# Patient Record
Sex: MALE | Race: WHITE | NOT HISPANIC OR LATINO | Employment: OTHER | URBAN - METROPOLITAN AREA
[De-identification: names, ages, dates, MRNs, and addresses within clinical notes are randomized per-mention and may not be internally consistent; named-entity substitution may affect disease eponyms.]

---

## 2018-07-16 ENCOUNTER — HOSPITAL ENCOUNTER (OUTPATIENT)
Dept: CT IMAGING | Facility: HOSPITAL | Age: 63
Discharge: HOME | End: 2018-07-16
Attending: DENTIST

## 2018-07-16 ENCOUNTER — SURGERY - HEALTHEAST (OUTPATIENT)
Dept: SURGERY | Facility: HOSPITAL | Age: 63
End: 2018-07-16

## 2018-07-16 ENCOUNTER — ANESTHESIA - HEALTHEAST (OUTPATIENT)
Dept: SURGERY | Facility: HOSPITAL | Age: 63
End: 2018-07-16

## 2018-07-16 ENCOUNTER — RECORDS - HEALTHEAST (OUTPATIENT)
Dept: ADMINISTRATIVE | Facility: OTHER | Age: 63
End: 2018-07-16

## 2018-07-16 DIAGNOSIS — K12.2 SUBMANDIBULAR ABSCESS: ICD-10-CM

## 2018-07-16 LAB
CREAT BLD-MCNC: 0.9 MG/DL
POC GFR AMER AF HE - HISTORICAL: >60 ML/MIN/1.73M2
POC GFR NON AMER AF HE - HISTORICAL: >60 ML/MIN/1.73M2

## 2018-07-16 ASSESSMENT — MIFFLIN-ST. JEOR: SCORE: 1585.04

## 2018-07-18 ASSESSMENT — MIFFLIN-ST. JEOR
SCORE: 1628.59
SCORE: 1627.25

## 2021-06-01 VITALS — HEIGHT: 70 IN | BODY MASS INDEX: 26.43 KG/M2 | WEIGHT: 184.6 LBS

## 2021-06-16 PROBLEM — M27.2 MANDIBULAR ABSCESS: Status: ACTIVE | Noted: 2018-07-16

## 2021-06-19 NOTE — ANESTHESIA POSTPROCEDURE EVALUATION
Patient: Dva Hernandezler  Incision and Drainage of Submandibular Abscess with Extraction of Tooth 31  Anesthesia type: general    Patient location: PACU  Last vitals:   Vitals:    07/16/18 2050   BP: 165/86   Pulse: (!) 104   Resp: 17   Temp:    SpO2: 95%     Post vital signs: stable  Level of consciousness: sedated  Post-anesthesia pain: pain controlled  Post-anesthesia nausea and vomiting: no  Pulmonary: Intubated/ventilated  Cardiovascular: stable and blood pressure at baseline  Hydration: adequate  Anesthetic events: no    QCDR Measures:  ASA# 11 - Daisy-op Cardiac Arrest: ASA11B - Patient did NOT experience unanticipated cardiac arrest  ASA# 12 - Daisy-op Mortality Rate: ASA12B - Patient did NOT die  ASA# 13 - PACU Re-Intubation Rate: ASA13B - Patient did NOT require a new airway mgmt  ASA# 10 - Composite Anes Safety: ASA10A - No serious adverse event    Additional Notes:Elected to keep patient intubated/sedated overnight to allow for swelling to go down.

## 2021-06-19 NOTE — ANESTHESIA CARE TRANSFER NOTE
Last vitals:   Vitals:    07/16/18 2029   BP: 120/65   Pulse: 95   Resp: 16   Temp: (!) 38.6  C (101.4  F)   SpO2: 95%     Patient's level of consciousness is unconscious  Spontaneous respirations: no   Maintains airway independently: no   Dentition unchanged: yes  Oropharynx: endotracheal tube in  place    QCDR Measures:  ASA# 20 - Surgical Safety Checklist: WHO surgical safety checklist completed prior to induction  PQRS# 430 - Adult PONV Prevention: 4558F - Pt received => 2 anti-emetic agents (different classes) preop & intraop  ASA# 8 - Peds PONV Prevention: NA - Not pediatric patient, not GA or 2 or more risk factors NOT present  PQRS# 424 - Daisy-op Temp Management: 4559F - At least one body temp DOCUMENTED => 35.5C or 95.9F within required timeframe  PQRS# 426 - PACU Transfer Protocol: - Transfer of care checklist used  ASA# 14 - Acute Post-op Pain: ASA14B - Patient did NOT experience pain >= 7 out of 10

## 2021-06-19 NOTE — ANESTHESIA PREPROCEDURE EVALUATION
Anesthesia Evaluation        Airway   Mallampati: III  Neck ROM: full  Comment: Trismus with very limited opening   Pulmonary - negative ROS and normal exam                          Cardiovascular - negative ROS and normal exam   Neuro/Psych - negative ROS     Endo/Other - negative ROS      GI/Hepatic/Renal - negative ROS           Dental - normal exam                        Anesthesia Plan  Planned anesthetic: general endotracheal    ASA 3 - emergent   Induction: intravenous   Anesthetic plan and risks discussed with: patient  Anesthesia plan special considerations: awake and fiberoptic, increased risk of difficult airway,   Post-op plan: routine recovery

## 2022-05-09 ENCOUNTER — HOSPITAL ENCOUNTER (INPATIENT)
Facility: HOSPITAL | Age: 67
LOS: 3 days | Discharge: HOME | DRG: 199 | End: 2022-05-12
Attending: EMERGENCY MEDICINE | Admitting: INTERNAL MEDICINE
Payer: COMMERCIAL

## 2022-05-09 ENCOUNTER — APPOINTMENT (OUTPATIENT)
Dept: CT IMAGING | Facility: HOSPITAL | Age: 67
DRG: 199 | End: 2022-05-09
Attending: EMERGENCY MEDICINE
Payer: COMMERCIAL

## 2022-05-09 DIAGNOSIS — S22.42XA MULTIPLE FRACTURES OF RIBS, LEFT SIDE, INITIAL ENCOUNTER FOR CLOSED FRACTURE: ICD-10-CM

## 2022-05-09 DIAGNOSIS — J94.2 HEMOTHORAX ON LEFT: ICD-10-CM

## 2022-05-09 DIAGNOSIS — J96.01 ACUTE RESPIRATORY FAILURE WITH HYPOXIA (H): Primary | ICD-10-CM

## 2022-05-09 DIAGNOSIS — R09.02 HYPOXIA: ICD-10-CM

## 2022-05-09 LAB
ABO/RH(D): NORMAL
ANION GAP SERPL CALCULATED.3IONS-SCNC: 14 MMOL/L (ref 5–18)
ANTIBODY SCREEN: NEGATIVE
BUN SERPL-MCNC: 10 MG/DL (ref 8–22)
CALCIUM SERPL-MCNC: 9.1 MG/DL (ref 8.5–10.5)
CHLORIDE BLD-SCNC: 96 MMOL/L (ref 98–107)
CO2 SERPL-SCNC: 23 MMOL/L (ref 22–31)
CREAT SERPL-MCNC: 0.78 MG/DL (ref 0.7–1.3)
ERYTHROCYTE [DISTWIDTH] IN BLOOD BY AUTOMATED COUNT: 12.5 % (ref 10–15)
GFR SERPL CREATININE-BSD FRML MDRD: >90 ML/MIN/1.73M2
GLUCOSE BLD-MCNC: 130 MG/DL (ref 70–125)
HCT VFR BLD AUTO: 33.6 % (ref 40–53)
HGB BLD-MCNC: 11.7 G/DL (ref 13.3–17.7)
HOLD SPECIMEN: NORMAL
INR PPP: 1.19 (ref 0.9–1.15)
MAGNESIUM SERPL-MCNC: 2.1 MG/DL (ref 1.8–2.6)
MCH RBC QN AUTO: 35.1 PG (ref 26.5–33)
MCHC RBC AUTO-ENTMCNC: 34.8 G/DL (ref 31.5–36.5)
MCV RBC AUTO: 101 FL (ref 78–100)
PHOSPHATE SERPL-MCNC: 2.8 MG/DL (ref 2.5–4.5)
PLATELET # BLD AUTO: 317 10E3/UL (ref 150–450)
POTASSIUM BLD-SCNC: 3.7 MMOL/L (ref 3.5–5)
RADIOLOGIST FLAGS: ABNORMAL
RBC # BLD AUTO: 3.33 10E6/UL (ref 4.4–5.9)
SARS-COV-2 RNA RESP QL NAA+PROBE: NEGATIVE
SODIUM SERPL-SCNC: 133 MMOL/L (ref 136–145)
SPECIMEN EXPIRATION DATE: NORMAL
WBC # BLD AUTO: 12.2 10E3/UL (ref 4–11)

## 2022-05-09 PROCEDURE — 71260 CT THORAX DX C+: CPT

## 2022-05-09 PROCEDURE — 84100 ASSAY OF PHOSPHORUS: CPT | Performed by: INTERNAL MEDICINE

## 2022-05-09 PROCEDURE — 85027 COMPLETE CBC AUTOMATED: CPT | Performed by: EMERGENCY MEDICINE

## 2022-05-09 PROCEDURE — 36415 COLL VENOUS BLD VENIPUNCTURE: CPT | Performed by: EMERGENCY MEDICINE

## 2022-05-09 PROCEDURE — 87635 SARS-COV-2 COVID-19 AMP PRB: CPT | Performed by: EMERGENCY MEDICINE

## 2022-05-09 PROCEDURE — 250N000011 HC RX IP 250 OP 636: Performed by: EMERGENCY MEDICINE

## 2022-05-09 PROCEDURE — 96361 HYDRATE IV INFUSION ADD-ON: CPT

## 2022-05-09 PROCEDURE — C9803 HOPD COVID-19 SPEC COLLECT: HCPCS

## 2022-05-09 PROCEDURE — 82306 VITAMIN D 25 HYDROXY: CPT | Performed by: INTERNAL MEDICINE

## 2022-05-09 PROCEDURE — 96374 THER/PROPH/DIAG INJ IV PUSH: CPT

## 2022-05-09 PROCEDURE — 99223 1ST HOSP IP/OBS HIGH 75: CPT | Performed by: INTERNAL MEDICINE

## 2022-05-09 PROCEDURE — 120N000004 HC R&B MS OVERFLOW

## 2022-05-09 PROCEDURE — 85610 PROTHROMBIN TIME: CPT | Performed by: EMERGENCY MEDICINE

## 2022-05-09 PROCEDURE — 258N000003 HC RX IP 258 OP 636: Performed by: EMERGENCY MEDICINE

## 2022-05-09 PROCEDURE — 83735 ASSAY OF MAGNESIUM: CPT | Performed by: INTERNAL MEDICINE

## 2022-05-09 PROCEDURE — 250N000011 HC RX IP 250 OP 636: Performed by: INTERNAL MEDICINE

## 2022-05-09 PROCEDURE — 82310 ASSAY OF CALCIUM: CPT | Performed by: EMERGENCY MEDICINE

## 2022-05-09 PROCEDURE — 258N000003 HC RX IP 258 OP 636: Performed by: INTERNAL MEDICINE

## 2022-05-09 PROCEDURE — 250N000013 HC RX MED GY IP 250 OP 250 PS 637: Performed by: INTERNAL MEDICINE

## 2022-05-09 PROCEDURE — 99285 EMERGENCY DEPT VISIT HI MDM: CPT | Mod: 25

## 2022-05-09 PROCEDURE — C9113 INJ PANTOPRAZOLE SODIUM, VIA: HCPCS | Performed by: INTERNAL MEDICINE

## 2022-05-09 RX ORDER — HYDRALAZINE HYDROCHLORIDE 20 MG/ML
10 INJECTION INTRAMUSCULAR; INTRAVENOUS EVERY 6 HOURS PRN
Status: DISCONTINUED | OUTPATIENT
Start: 2022-05-09 | End: 2022-05-09

## 2022-05-09 RX ORDER — IOPAMIDOL 755 MG/ML
100 INJECTION, SOLUTION INTRAVASCULAR ONCE
Status: COMPLETED | OUTPATIENT
Start: 2022-05-09 | End: 2022-05-09

## 2022-05-09 RX ORDER — NALOXONE HYDROCHLORIDE 0.4 MG/ML
0.4 INJECTION, SOLUTION INTRAMUSCULAR; INTRAVENOUS; SUBCUTANEOUS
Status: DISCONTINUED | OUTPATIENT
Start: 2022-05-09 | End: 2022-05-12 | Stop reason: HOSPADM

## 2022-05-09 RX ORDER — OXYCODONE HYDROCHLORIDE 5 MG/1
5 TABLET ORAL EVERY 4 HOURS PRN
Status: DISCONTINUED | OUTPATIENT
Start: 2022-05-09 | End: 2022-05-12 | Stop reason: HOSPADM

## 2022-05-09 RX ORDER — LIDOCAINE 4 G/G
1 PATCH TOPICAL EVERY 24 HOURS
Status: DISCONTINUED | OUTPATIENT
Start: 2022-05-09 | End: 2022-05-12 | Stop reason: HOSPADM

## 2022-05-09 RX ORDER — ONDANSETRON 2 MG/ML
4 INJECTION INTRAMUSCULAR; INTRAVENOUS EVERY 6 HOURS PRN
Status: DISCONTINUED | OUTPATIENT
Start: 2022-05-09 | End: 2022-05-12 | Stop reason: HOSPADM

## 2022-05-09 RX ORDER — LANOLIN ALCOHOL/MO/W.PET/CERES
3 CREAM (GRAM) TOPICAL
Status: DISCONTINUED | OUTPATIENT
Start: 2022-05-09 | End: 2022-05-12 | Stop reason: HOSPADM

## 2022-05-09 RX ORDER — AMOXICILLIN 250 MG
1 CAPSULE ORAL 2 TIMES DAILY
Status: DISCONTINUED | OUTPATIENT
Start: 2022-05-09 | End: 2022-05-12 | Stop reason: HOSPADM

## 2022-05-09 RX ORDER — NALOXONE HYDROCHLORIDE 0.4 MG/ML
0.2 INJECTION, SOLUTION INTRAMUSCULAR; INTRAVENOUS; SUBCUTANEOUS
Status: DISCONTINUED | OUTPATIENT
Start: 2022-05-09 | End: 2022-05-12 | Stop reason: HOSPADM

## 2022-05-09 RX ORDER — METHOCARBAMOL 500 MG/1
500 TABLET, FILM COATED ORAL EVERY 6 HOURS PRN
Status: DISCONTINUED | OUTPATIENT
Start: 2022-05-09 | End: 2022-05-12 | Stop reason: HOSPADM

## 2022-05-09 RX ORDER — MAGNESIUM HYDROXIDE/ALUMINUM HYDROXICE/SIMETHICONE 120; 1200; 1200 MG/30ML; MG/30ML; MG/30ML
30 SUSPENSION ORAL EVERY 4 HOURS PRN
Status: DISCONTINUED | OUTPATIENT
Start: 2022-05-09 | End: 2022-05-12 | Stop reason: HOSPADM

## 2022-05-09 RX ORDER — ONDANSETRON 4 MG/1
4 TABLET, ORALLY DISINTEGRATING ORAL EVERY 6 HOURS PRN
Status: DISCONTINUED | OUTPATIENT
Start: 2022-05-09 | End: 2022-05-12 | Stop reason: HOSPADM

## 2022-05-09 RX ORDER — AMOXICILLIN 250 MG
2 CAPSULE ORAL 2 TIMES DAILY
Status: DISCONTINUED | OUTPATIENT
Start: 2022-05-09 | End: 2022-05-12 | Stop reason: HOSPADM

## 2022-05-09 RX ORDER — HYDRALAZINE HYDROCHLORIDE 20 MG/ML
10 INJECTION INTRAMUSCULAR; INTRAVENOUS EVERY 6 HOURS PRN
Status: DISCONTINUED | OUTPATIENT
Start: 2022-05-09 | End: 2022-05-12 | Stop reason: HOSPADM

## 2022-05-09 RX ORDER — GABAPENTIN 300 MG/1
300 CAPSULE ORAL 3 TIMES DAILY
Status: DISCONTINUED | OUTPATIENT
Start: 2022-05-09 | End: 2022-05-12 | Stop reason: HOSPADM

## 2022-05-09 RX ORDER — ACETAMINOPHEN 325 MG/1
975 TABLET ORAL EVERY 8 HOURS
Status: DISCONTINUED | OUTPATIENT
Start: 2022-05-09 | End: 2022-05-12 | Stop reason: HOSPADM

## 2022-05-09 RX ORDER — SODIUM CHLORIDE 9 MG/ML
INJECTION, SOLUTION INTRAVENOUS CONTINUOUS
Status: DISCONTINUED | OUTPATIENT
Start: 2022-05-09 | End: 2022-05-11

## 2022-05-09 RX ORDER — KETOROLAC TROMETHAMINE 15 MG/ML
15 INJECTION, SOLUTION INTRAMUSCULAR; INTRAVENOUS EVERY 6 HOURS
Status: COMPLETED | OUTPATIENT
Start: 2022-05-09 | End: 2022-05-10

## 2022-05-09 RX ORDER — BISACODYL 10 MG
10 SUPPOSITORY, RECTAL RECTAL DAILY PRN
Status: DISCONTINUED | OUTPATIENT
Start: 2022-05-09 | End: 2022-05-12 | Stop reason: HOSPADM

## 2022-05-09 RX ORDER — HYDROMORPHONE HYDROCHLORIDE 1 MG/ML
0.5 INJECTION, SOLUTION INTRAMUSCULAR; INTRAVENOUS; SUBCUTANEOUS EVERY 4 HOURS PRN
Status: DISCONTINUED | OUTPATIENT
Start: 2022-05-09 | End: 2022-05-12 | Stop reason: HOSPADM

## 2022-05-09 RX ADMIN — PANTOPRAZOLE SODIUM 40 MG: 40 INJECTION, POWDER, FOR SOLUTION INTRAVENOUS at 22:30

## 2022-05-09 RX ADMIN — HYDROMORPHONE HYDROCHLORIDE 1 MG: 1 INJECTION, SOLUTION INTRAMUSCULAR; INTRAVENOUS; SUBCUTANEOUS at 18:26

## 2022-05-09 RX ADMIN — SODIUM CHLORIDE: 9 INJECTION, SOLUTION INTRAVENOUS at 22:35

## 2022-05-09 RX ADMIN — ACETAMINOPHEN 975 MG: 325 TABLET ORAL at 22:29

## 2022-05-09 RX ADMIN — SODIUM CHLORIDE 1000 ML: 9 INJECTION, SOLUTION INTRAVENOUS at 18:21

## 2022-05-09 RX ADMIN — IOPAMIDOL 100 ML: 755 INJECTION, SOLUTION INTRAVENOUS at 17:31

## 2022-05-09 RX ADMIN — KETOROLAC TROMETHAMINE 15 MG: 15 INJECTION, SOLUTION INTRAMUSCULAR; INTRAVENOUS at 22:29

## 2022-05-09 RX ADMIN — LIDOCAINE 1 PATCH: 246 PATCH TOPICAL at 22:34

## 2022-05-09 RX ADMIN — GABAPENTIN 300 MG: 300 CAPSULE ORAL at 22:30

## 2022-05-09 ASSESSMENT — ACTIVITIES OF DAILY LIVING (ADL)
ADLS_ACUITY_SCORE: 35
DEPENDENT_IADLS:: INDEPENDENT
ADLS_ACUITY_SCORE: 20
ADLS_ACUITY_SCORE: 20
ADLS_ACUITY_SCORE: 35

## 2022-05-09 NOTE — ED PROVIDER NOTES
EMERGENCY DEPARTMENT ENCOUNTER      NAME: Dav Jane  AGE: 67 year old male  YOB: 1955  MRN: [id number]  EVALUATION DATE & TIME: 5/9/2022  6:15 PM    PCP: No primary care provider on file.    ED PROVIDER: Bandar Zaragoza M.D.      Chief Complaint   Patient presents with     Fall     Rib Pain     Shortness of Breath       FINAL IMPRESSION:  Multiple left rib fractures  Left hemothorax  Hypoxia      ED COURSE & MEDICAL DECISION MAKING:    Pertinent Labs & Imaging studies reviewed. (See chart for details)  67 year old male presents to the Emergency Department for evaluation of this of breath and left-sided chest pain.  Patient fell on his garage steps 3 weeks ago.  Patient with immediate discomfort.  Hoping for resolution.  Gradually having increasing pain, shortness of breath and discomfort.  Laboratory evaluation and CT imaging obtained from triage.  INR slightly elevated at 1.19.  Patient does take Eliquis.  Electrolytes essentially unremarkable other than minimal hyponatremia sodium 133.  CBC with white cell count of 12.2 minimally elevated.  Hemoglobin slightly reduced 11.7.  Platelets normal at 317.  CT imaging with 6 through ninth left rib fractures.  Near complete opacification of the left thorax.  On exam patient in moderate distress.  Hypoxic at 82% on room air.  With supplemental oxygen of 4 L oxygenation improved to 95%.  Initially markedly hypertensive.  Intravenous Dilaudid x1 mg given for discomfort.  Discussion of chest tube placement with patient.  Patient appears non toxic    6:19 PM I met with the patient for the initial interview and physical examination. Discussed plan for treatment and workup in the ED. patient consented for chest tube.  However on examination patient noted to have a flail segment and rib fractures right in the area where chest tube would be placed.  We will consult interventional radiology  6:33 PM Discussed with Dr. Ford, general surgery.  He will consult  on patient in the morning.  7:09 PM Discussed with Dr. De La Rosa, interventional radiology.  Chest tube to be placed in the morning.  Patient presently stable.  This is agreeable.    7:12 PM Checked on patient and updated on plan.  He is agreeable.  Call placed to hospitalist.  Patient will be swabbed for COVID.  After Dilaudid patient's heart rate improved and blood pressure moderated to 150s systolic.  7:45 PM Discussed with MERVIN, hospitalist, who accepts patient for admission.    At the conclusion of the encounter I discussed the results of all of the tests and the disposition. The questions were answered and return precautions provided. The patient or family acknowledged understanding and was agreeable with the care plan.       Patient represents critical care situation.  Proximately 30 minutes spent directly involved patient's care independent of any procedures.      PPE: Provider wore gloves, N95 mask, eye protection, surgical cap.     MEDICATIONS GIVEN IN THE EMERGENCY:  Medications   0.9% sodium chloride BOLUS (1,000 mLs Intravenous New Bag 5/9/22 1821)   iopamidol (ISOVUE-370) solution 100 mL (100 mLs Intravenous Given 5/9/22 1731)   HYDROmorphone (DILAUDID) injection 1 mg (1 mg Intravenous Given 5/9/22 1826)       NEW PRESCRIPTIONS STARTED AT TODAY'S ER VISIT  New Prescriptions    No medications on file     =================================================================    HPI    Patient information was obtained from: Patient    Use of Intrepreter: N/A      Dav Jane is a 67 year old male without a pertient medical history who presents to the ED for evaluation of a fall, shortness of breath. Patient reports he slipped on concrete steps in his garage and fell back onto edge of the steps around 3 weeks ago. [facility] and had CXR which showed multiple rib fractures and a pleural effusion. Patient endorses left sided chest wall pain and shortness of breath that has been ongoing  since the fall, unable to tolerate anymore. He denies any blood thinners or daily medications. Denies smoking or alcohol use. Denies any other concerns.      REVIEW OF SYSTEMS   Constitutional:  Denies fever, chills  Respiratory:  Denies productive cough. Positive for shortness of breath.  Cardiovascular:  Denies chest pain, palpitations  GI:  Denies abdominal pain, nausea, vomiting, or change in bowel or bladder habits   Musculoskeletal:  Denies any new muscle/joint swelling. Positive for left chest wall pain.  Skin:  Denies rash   Neurologic: Denies focal weakness  All systems negative except as marked.     PAST MEDICAL HISTORY:  History reviewed. No pertinent past medical history.    PAST SURGICAL HISTORY:  Past Surgical History:   Procedure Laterality Date     RI DENTAL SURGERY PROCEDURE N/A 7/16/2018    Procedure: Incision and Drainage of Submandibular Abscess with Extraction of Tooth 31;  Surgeon: Rasta Borja DDS;  Location: [facility];  Service: Oral Surgery         CURRENT MEDICATIONS:      Current Facility-Administered Medications:      0.9% sodium chloride BOLUS, 1,000 mL, Intravenous, Once, Bandar Zaragoza MD, Last Rate: 250 mL/hr at 05/09/22 1821, 1,000 mL at 05/09/22 1821  No current outpatient medications on file.    ALLERGIES:  Allergies   Allergen Reactions     Pollen Extract Itching     Seasonal allergic rhinitis        FAMILY HISTORY:  History reviewed. No pertinent family history.    SOCIAL HISTORY:   Social History     Socioeconomic History     Marital status:    Tobacco Use     Smoking status: Never Smoker     Smokeless tobacco: Former User     Types: Chew, Chew   Substance and Sexual Activity     Alcohol use: Yes     Alcohol/week: 7.0 - 14.0 standard drinks     Comment: Alcoholic Drinks/day: 7-14 cans a week     Drug use: No       VITALS:  Patient Vitals for the past 24 hrs:   BP Temp Temp src Pulse Resp SpO2 Weight   05/09/22 1915 (!) 158/91 -- -- 99 24 97 % --    05/09/22 1900 (!) 177/98 -- -- 100 26 97 % --   05/09/22 1845 (!) 190/97 -- -- 99 27 97 % --   05/09/22 1830 (!) 157/89 -- -- 100 24 97 % --   05/09/22 1546 (!) 188/95 98.2  F (36.8  C) Oral 105 24 95 % 83.9 kg (185 lb)        PHYSICAL EXAM    Constitutional:  Awake, alert. Moderate distress.  HENT:  Normocephalic, Atraumatic. Bilateral external ears normal. Oropharynx moist. Nose normal. Neck- Normal range of motion with no guarding, No midline cervical tenderness, Supple, No stridor.   Eyes:  PERRL, EOMI with no signs of entrapment, Conjunctiva normal, No discharge.   Respiratory:  Lungs clear on right, minimal breath sounds superior left chest. No breath sounds inferior left chest. No respiratory distress, No wheezing.    Cardiovascular:  Normal heart rate, Normal rhythm, No appreciable rubs or gallops.   GI:  Soft, No tenderness, No distension, No palpable masses  Musculoskeletal:  Intact distal pulses, No edema. Good range of motion in all major joints. Flail segment left lateral chest wall that collapses on inspiration.  Integument:  Warm, Dry, No erythema, No rash.   Neurologic:  Alert & oriented, Normal motor function, Normal sensory function, No focal deficits noted.   Psychiatric:  Affect normal, Judgment normal, Mood normal.     LAB:  All pertinent labs reviewed and interpreted.  Results for orders placed or performed during the hospital encounter of 05/09/22   CT Chest w Contrast   Result Value Ref Range    Radiologist flags (Urgent)      Large left hemothorax with acute hemorrhagic components and multiple rib fractures. No pneumothorax. Needs a chest tube.    Impression    IMPRESSION:   1.  Large left-sided hemothorax with acute hemorrhagic components suggesting recent bleed into the effusion. No pneumothorax.  2.  Multiple acute, left-sided rib fractures as noted above.  3.  Mild scattered groundglass opacities are noted in the right upper lobe with a 4 mm subpleural nodule middle lobe. Follow-up  recommendations given below.  4.  No evidence of acute injury to the upper abdomen.  5.  Hepatic steatosis with incidental simple liver cyst.      [Urgent Result: Large left hemothorax with acute hemorrhagic components and multiple rib fractures. No pneumothorax. Needs a chest tube.    Finding was identified on 5/9/2022 5:58 PM.      Bandar Goss was contacted by me on 5/9/2022 6:07 PM and verbalized understanding of the critical result.    REFERENCE:  Guidelines for Management of Incidental Pulmonary Nodules Detected on CT Images: From the Fleischner Society 2017.   Guidelines apply to incidental nodules in patients who are 35 years or older.  Guidelines do not apply to lung cancer screening, patients with immunosuppression, or patients with known primary cancer.    SINGLE NODULE  Nodule size <6 mm  Low-risk patients: No follow-up needed.  High-risk patients: Optional follow-up at 12 months.     CBC (+ platelets, no diff)   Result Value Ref Range    WBC Count 12.2 (H) 4.0 - 11.0 10e3/uL    RBC Count 3.33 (L) 4.40 - 5.90 10e6/uL    Hemoglobin 11.7 (L) 13.3 - 17.7 g/dL    Hematocrit 33.6 (L) 40.0 - 53.0 %     (H) 78 - 100 fL    MCH 35.1 (H) 26.5 - 33.0 pg    MCHC 34.8 31.5 - 36.5 g/dL    RDW 12.5 10.0 - 15.0 %    Platelet Count 317 150 - 450 10e3/uL   Basic metabolic panel   Result Value Ref Range    Sodium 133 (L) 136 - 145 mmol/L    Potassium 3.7 3.5 - 5.0 mmol/L    Chloride 96 (L) 98 - 107 mmol/L    Carbon Dioxide (CO2) 23 22 - 31 mmol/L    Anion Gap 14 5 - 18 mmol/L    Urea Nitrogen 10 8 - 22 mg/dL    Creatinine 0.78 0.70 - 1.30 mg/dL    Calcium 9.1 8.5 - 10.5 mg/dL    Glucose 130 (H) 70 - 125 mg/dL    GFR Estimate >90 >60 mL/min/1.73m2   Extra Red Top Tube   Result Value Ref Range    Hold Specimen JIC    Extra Green Top (Lithium Heparin) Tube   Result Value Ref Range    Hold Specimen JIC    Extra Purple Top Tube   Result Value Ref Range    Hold Specimen JIC    Result Value Ref Range    INR 1.19 (H) 0.90  - 1.15       RADIOLOGY:  Reviewed all pertinent imaging. Please see official radiology report.  CT Chest w Contrast   Final Result   Abnormal   IMPRESSION:    1.  Large left-sided hemothorax with acute hemorrhagic components suggesting recent bleed into the effusion. No pneumothorax.   2.  Multiple acute, left-sided rib fractures as noted above.   3.  Mild scattered groundglass opacities are noted in the right upper lobe with a 4 mm subpleural nodule middle lobe. Follow-up recommendations given below.   4.  No evidence of acute injury to the upper abdomen.   5.  Hepatic steatosis with incidental simple liver cyst.         [Urgent Result: Large left hemothorax with acute hemorrhagic components and multiple rib fractures. No pneumothorax. Needs a chest tube.      Finding was identified on 5/9/2022 5:58 PM.        Bandar Goss was contacted by me on 5/9/2022 6:07 PM and verbalized understanding of the critical result.      REFERENCE:   Guidelines for Management of Incidental Pulmonary Nodules Detected on CT Images: From the Fleischner Society 2017.    Guidelines apply to incidental nodules in patients who are 35 years or older.   Guidelines do not apply to lung cancer screening, patients with immunosuppression, or patients with known primary cancer.      SINGLE NODULE   Nodule size <6 mm   Low-risk patients: No follow-up needed.   High-risk patients: Optional follow-up at 12 months.         IR Chest Tube Place Non Tunneled Left    (Results Pending)       I, Jong Shields, am serving as a scribe to document services personally performed by Bandar Zaragoza MD, based on my observation and the provider's statements to me. I, Bandar Zaragoza MD attest that Jong Shields is acting in a scribe capacity, has observed my performance of the services and has documented them in accordance with my direction.    Bandar Zaragoza M.D.  Emergency Medicine  [facility] EMERGENCY DEPARTMENT      Bandar Zaragoza MD  05/09/22 1955

## 2022-05-09 NOTE — PHARMACY-ADMISSION MEDICATION HISTORY
Pharmacy Note - Admission Medication History    Pertinent Provider Information: he states that blood pressure pills are on the horizon as he has been hypertensive for many months otherwise he takes no medications.      ______________________________________________________________________    Prior To Admission (PTA) med list completed and updated in EMR.       No outpatient medications have been marked as taking for the 5/9/22 encounter (Hospital Encounter).       Information source(s): Patient, Clinic records, Hospital records and [facility]  Method of interview communication: in-person    Summary of Changes to PTA Med List  New: nothing   Discontinued: nothing   Changed: nothing     Patient was asked about OTC/herbal products specifically.  PTA med list reflects this.    In the past week, patient estimated taking medication this percent of the time:  greater than 90%.    Allergies were reviewed, assessed, and updated with the patient.      Patient does not use any multi-dose medications prior to admission.    The information provided in this note is only as accurate as the sources available at the time of the update(s).    Thank you for the opportunity to participate in the care of this patient.    Kenn Urena [facility]  5/9/2022 6:55 PM

## 2022-05-09 NOTE — ED TRIAGE NOTES
"Patient arrives by private car for evaluation of left rib pain and pleural effusion.  Patient was seen at clinic and sent here.  Had fall 4/17 and has known rib fractures was advised to come here as he has a \"large Pleuaral effusion in left lung- possible hemothorax, per report from [facility].      "

## 2022-05-09 NOTE — ED NOTES
ED Provider In Triage Note  [facility]  Encounter Date: May 9, 2022    Chief Complaint   Patient presents with     Fall     Rib Pain     Shortness of Breath         Use of Intrepreter: N/A     Brief HPI:   Dav Jane is a 67 year old male presenting to the Emergency Department with a chief complaint of pleural effusion.    Pt feel 3 weeks ago and sustained rib fractures. Went to [facility] and CXR shows pleural effusion. Sent to ER for eval.     pO 93% on RA at clinic    CXR; multiple rib fx with large pleural effusion per patient paperwork.        Brief Physical Exam:  There were no vitals taken for this visit.  General: Non-toxic appearing  HEENT: Atraumatic  Resp: No respiratory distress  Abdomen: Non-peritoneal  Neuro: Alert, oriented, answers questions appropriately  Psych: Behavior appropriate  Musculoskeletal: atraumatic      Plan Initiated in Triage:  Labs and CT chest    PIT Dispo:   Return to lobby while awaiting workup and ED bed availability          Elsi Hernandez MD on 5/9/2022 at 3:46 PM        Patient was evaluated by the Physician in Triage due to a limitation of available rooms in the Emergency Department. A plan of care was discussed based on the information obtained on the initial evaluation and patient was counseled to return back to the Emergency Department lobby after this initial evalutaiton until results were obtained or a room became available in the Emergency Department. Patient was counseled not to leave prior to receiving the results of their workup.            Elsi Hernandez MD  05/09/22 1541

## 2022-05-10 ENCOUNTER — APPOINTMENT (OUTPATIENT)
Dept: RADIOLOGY | Facility: HOSPITAL | Age: 67
DRG: 199 | End: 2022-05-10
Attending: SURGERY
Payer: COMMERCIAL

## 2022-05-10 ENCOUNTER — APPOINTMENT (OUTPATIENT)
Dept: PHYSICAL THERAPY | Facility: HOSPITAL | Age: 67
DRG: 199 | End: 2022-05-10
Attending: INTERNAL MEDICINE
Payer: COMMERCIAL

## 2022-05-10 ENCOUNTER — APPOINTMENT (OUTPATIENT)
Dept: RADIOLOGY | Facility: HOSPITAL | Age: 67
DRG: 199 | End: 2022-05-10
Attending: INTERNAL MEDICINE
Payer: COMMERCIAL

## 2022-05-10 LAB
ALBUMIN SERPL-MCNC: 2.2 G/DL (ref 3.5–5)
ALP SERPL-CCNC: 82 U/L (ref 45–120)
ALT SERPL W P-5'-P-CCNC: 25 U/L (ref 0–45)
ANION GAP SERPL CALCULATED.3IONS-SCNC: 12 MMOL/L (ref 5–18)
AST SERPL W P-5'-P-CCNC: 28 U/L (ref 0–40)
BASE EXCESS BLDA CALC-SCNC: 0.4 MMOL/L
BILIRUB SERPL-MCNC: 0.6 MG/DL (ref 0–1)
BUN SERPL-MCNC: 12 MG/DL (ref 8–22)
CALCIUM SERPL-MCNC: 7.7 MG/DL (ref 8.5–10.5)
CHLORIDE BLD-SCNC: 100 MMOL/L (ref 98–107)
CO2 SERPL-SCNC: 22 MMOL/L (ref 22–31)
COHGB MFR BLD: 98.7 % (ref 95–96)
CREAT SERPL-MCNC: 0.81 MG/DL (ref 0.7–1.3)
DEPRECATED CALCIDIOL+CALCIFEROL SERPL-MC: 49 UG/L (ref 20–75)
ERYTHROCYTE [DISTWIDTH] IN BLOOD BY AUTOMATED COUNT: 12.5 % (ref 10–15)
FLOW: 3 LPM
GFR SERPL CREATININE-BSD FRML MDRD: >90 ML/MIN/1.73M2
GLUCOSE BLD-MCNC: 112 MG/DL (ref 70–125)
HCO3 BLD-SCNC: 25 MMOL/L (ref 23–29)
HCT VFR BLD AUTO: 28.6 % (ref 40–53)
HGB BLD-MCNC: 11 G/DL (ref 13.3–17.7)
HGB BLD-MCNC: 9.5 G/DL (ref 13.3–17.7)
INR PPP: 1.24 (ref 0.9–1.15)
MCH RBC QN AUTO: 34.1 PG (ref 26.5–33)
MCHC RBC AUTO-ENTMCNC: 33.2 G/DL (ref 31.5–36.5)
MCV RBC AUTO: 103 FL (ref 78–100)
OXYHGB MFR BLD: 96.5 % (ref 95–96)
PCO2 BLD: 39 MM HG (ref 35–45)
PH BLD: 7.41 [PH] (ref 7.37–7.44)
PLATELET # BLD AUTO: 244 10E3/UL (ref 150–450)
PO2 BLD: 89 MM HG (ref 75–85)
POTASSIUM BLD-SCNC: 3.5 MMOL/L (ref 3.5–5)
PROT SERPL-MCNC: 6.1 G/DL (ref 6–8)
RBC # BLD AUTO: 2.79 10E6/UL (ref 4.4–5.9)
SODIUM SERPL-SCNC: 134 MMOL/L (ref 136–145)
TEMPERATURE: 37 DEGREES C
VENTILATION MODE: ABNORMAL
WBC # BLD AUTO: 8 10E3/UL (ref 4–11)

## 2022-05-10 PROCEDURE — 250N000009 HC RX 250

## 2022-05-10 PROCEDURE — 97161 PT EVAL LOW COMPLEX 20 MIN: CPT | Mod: GP

## 2022-05-10 PROCEDURE — 99233 SBSQ HOSP IP/OBS HIGH 50: CPT | Performed by: EMERGENCY MEDICINE

## 2022-05-10 PROCEDURE — 80053 COMPREHEN METABOLIC PANEL: CPT | Performed by: INTERNAL MEDICINE

## 2022-05-10 PROCEDURE — 32551 INSERTION OF CHEST TUBE: CPT | Performed by: SURGERY

## 2022-05-10 PROCEDURE — 85027 COMPLETE CBC AUTOMATED: CPT | Performed by: INTERNAL MEDICINE

## 2022-05-10 PROCEDURE — 250N000013 HC RX MED GY IP 250 OP 250 PS 637: Performed by: INTERNAL MEDICINE

## 2022-05-10 PROCEDURE — 36600 WITHDRAWAL OF ARTERIAL BLOOD: CPT

## 2022-05-10 PROCEDURE — 0W9B30Z DRAINAGE OF LEFT PLEURAL CAVITY WITH DRAINAGE DEVICE, PERCUTANEOUS APPROACH: ICD-10-PCS | Performed by: SURGERY

## 2022-05-10 PROCEDURE — 71045 X-RAY EXAM CHEST 1 VIEW: CPT

## 2022-05-10 PROCEDURE — 258N000003 HC RX IP 258 OP 636: Performed by: INTERNAL MEDICINE

## 2022-05-10 PROCEDURE — C9113 INJ PANTOPRAZOLE SODIUM, VIA: HCPCS | Performed by: INTERNAL MEDICINE

## 2022-05-10 PROCEDURE — 94150 VITAL CAPACITY TEST: CPT

## 2022-05-10 PROCEDURE — 999N000157 HC STATISTIC RCP TIME EA 10 MIN

## 2022-05-10 PROCEDURE — 36415 COLL VENOUS BLD VENIPUNCTURE: CPT | Performed by: INTERNAL MEDICINE

## 2022-05-10 PROCEDURE — 999N000065 XR CHEST PORT 1 VIEW

## 2022-05-10 PROCEDURE — 36415 COLL VENOUS BLD VENIPUNCTURE: CPT | Performed by: EMERGENCY MEDICINE

## 2022-05-10 PROCEDURE — 71045 X-RAY EXAM CHEST 1 VIEW: CPT | Mod: 77

## 2022-05-10 PROCEDURE — 85018 HEMOGLOBIN: CPT | Performed by: EMERGENCY MEDICINE

## 2022-05-10 PROCEDURE — 99222 1ST HOSP IP/OBS MODERATE 55: CPT | Mod: 25 | Performed by: SURGERY

## 2022-05-10 PROCEDURE — 120N000004 HC R&B MS OVERFLOW

## 2022-05-10 PROCEDURE — 85610 PROTHROMBIN TIME: CPT | Performed by: INTERNAL MEDICINE

## 2022-05-10 PROCEDURE — 94150 VITAL CAPACITY TEST: CPT | Performed by: PEDIATRICS

## 2022-05-10 PROCEDURE — 82805 BLOOD GASES W/O2 SATURATION: CPT | Performed by: INTERNAL MEDICINE

## 2022-05-10 PROCEDURE — 86850 RBC ANTIBODY SCREEN: CPT | Performed by: INTERNAL MEDICINE

## 2022-05-10 PROCEDURE — 250N000011 HC RX IP 250 OP 636: Performed by: INTERNAL MEDICINE

## 2022-05-10 PROCEDURE — 97530 THERAPEUTIC ACTIVITIES: CPT | Mod: GP

## 2022-05-10 RX ORDER — LIDOCAINE HYDROCHLORIDE 10 MG/ML
INJECTION, SOLUTION EPIDURAL; INFILTRATION; INTRACAUDAL; PERINEURAL
Status: COMPLETED
Start: 2022-05-10 | End: 2022-05-10

## 2022-05-10 RX ORDER — CEFAZOLIN SODIUM 2 G/100ML
2 INJECTION, SOLUTION INTRAVENOUS
Status: CANCELLED | OUTPATIENT
Start: 2022-05-10

## 2022-05-10 RX ORDER — CEFAZOLIN SODIUM 2 G/100ML
2 INJECTION, SOLUTION INTRAVENOUS SEE ADMIN INSTRUCTIONS
Status: CANCELLED | OUTPATIENT
Start: 2022-05-10

## 2022-05-10 RX ORDER — LIDOCAINE HYDROCHLORIDE 10 MG/ML
10 INJECTION, SOLUTION EPIDURAL; INFILTRATION; INTRACAUDAL; PERINEURAL ONCE
Status: COMPLETED | OUTPATIENT
Start: 2022-05-10 | End: 2022-05-10

## 2022-05-10 RX ADMIN — HYDROMORPHONE HYDROCHLORIDE 0.5 MG: 1 INJECTION, SOLUTION INTRAMUSCULAR; INTRAVENOUS; SUBCUTANEOUS at 13:18

## 2022-05-10 RX ADMIN — HYDROMORPHONE HYDROCHLORIDE 0.5 MG: 1 INJECTION, SOLUTION INTRAMUSCULAR; INTRAVENOUS; SUBCUTANEOUS at 11:04

## 2022-05-10 RX ADMIN — SENNOSIDES AND DOCUSATE SODIUM 1 TABLET: 8.6; 5 TABLET ORAL at 08:34

## 2022-05-10 RX ADMIN — ACETAMINOPHEN 975 MG: 325 TABLET ORAL at 21:06

## 2022-05-10 RX ADMIN — LIDOCAINE 1 PATCH: 246 PATCH TOPICAL at 21:05

## 2022-05-10 RX ADMIN — OXYCODONE HYDROCHLORIDE 5 MG: 5 TABLET ORAL at 17:40

## 2022-05-10 RX ADMIN — GABAPENTIN 300 MG: 300 CAPSULE ORAL at 13:18

## 2022-05-10 RX ADMIN — GABAPENTIN 300 MG: 300 CAPSULE ORAL at 08:33

## 2022-05-10 RX ADMIN — ACETAMINOPHEN 975 MG: 325 TABLET ORAL at 13:18

## 2022-05-10 RX ADMIN — SODIUM CHLORIDE: 9 INJECTION, SOLUTION INTRAVENOUS at 20:47

## 2022-05-10 RX ADMIN — LIDOCAINE HYDROCHLORIDE 10 ML: 10 INJECTION, SOLUTION EPIDURAL; INFILTRATION; INTRACAUDAL; PERINEURAL at 11:04

## 2022-05-10 RX ADMIN — KETOROLAC TROMETHAMINE 15 MG: 15 INJECTION, SOLUTION INTRAMUSCULAR; INTRAVENOUS at 10:47

## 2022-05-10 RX ADMIN — PANTOPRAZOLE SODIUM 40 MG: 40 INJECTION, POWDER, FOR SOLUTION INTRAVENOUS at 08:33

## 2022-05-10 RX ADMIN — HYDROMORPHONE HYDROCHLORIDE 0.5 MG: 1 INJECTION, SOLUTION INTRAMUSCULAR; INTRAVENOUS; SUBCUTANEOUS at 11:06

## 2022-05-10 RX ADMIN — GABAPENTIN 300 MG: 300 CAPSULE ORAL at 21:06

## 2022-05-10 RX ADMIN — KETOROLAC TROMETHAMINE 15 MG: 15 INJECTION, SOLUTION INTRAMUSCULAR; INTRAVENOUS at 04:13

## 2022-05-10 RX ADMIN — SENNOSIDES AND DOCUSATE SODIUM 1 TABLET: 8.6; 5 TABLET ORAL at 21:06

## 2022-05-10 ASSESSMENT — ACTIVITIES OF DAILY LIVING (ADL)
ADLS_ACUITY_SCORE: 20
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
## 2022-05-10 NOTE — PROGRESS NOTES
Patient evaluated, trauma fall with multiple Lt rib fractures, large left pleural effusion, large left hemothorax, BS diminished bilat,               NIF > - 60 cm, FVC 1.38 L, pt sitting on edge of bed, appears slightly dyspneic, talking a lot. Instructed pt on flutter valve and IS, good technique and effort, IS predicted volume 2600 ml, pt achieving 1500 ml. Flutter valve performed x 6 minutes. OR/IR pending. RT to monitor.     Addi Lutz, RT

## 2022-05-10 NOTE — PROCEDURES
Indication: 67-year-old male who had suffered a fall around [facility].  Patient developed multiple rib fractures as well as left-sided hemothorax.  Patient was seen by the general surgery team for the left-sided pneumothorax.  We attempted to take the patient for a left VATS unfortunately we were unable to coordinate a time for today.  We decided place a left-sided chest tube to help with drainage of the left-sided hemothorax in preparation for VATS tomorrow.The risks and benefits of the procedure were explained detail to the patient. The risks include infection, bleeding, damage to the surrounding structures. Patient verbalized understanding provided consent to undergo the procedure above.    Procedure: Patient was then placed in a right-sided decubitus position.  The patient had his right chest prepped and draped in usual sterile fashion.  Prior to initiate the procedure, timeout was completed.  All present were in agreement.    1% lidocaine with epinephrine was then used to localize over the fifth intercostal space slightly anterior to the left axillary line.  The 15 blade was then used to make a 2 cm transverse incision over the site.  Dissection through the subcutaneous tissue and into the left chest cavity was done using a Padma clamp.  Once inside the left chest, there was a rush of old blood.  The 32 [place]ese chest tube was then placed inside the left chest tube and placed onto the chest tube atrium.  The chest tube was then anchored down using a 2-0 silk stitch as well as a 2-0 Vicryl stitch.  Sterile dressings were then applied.  There is noted to be a +1 air leak on inspiration.    Patient then had 2 L of old blood drained.  The chest tube was clamped off, and the patient's vital signs were evaluated.  We waited approximately 10 minutes, and the patient's vital signs continue be stable.  The chest tube was then unclamped and another 2 L of old blood was then drained.  The chest tube was then clamped off again.   The patient's vital signs were reevaluated and found to be in stable condition.  The chest tube was unclamped.   After the procedure was completed a postop chest x-ray was completed.

## 2022-05-10 NOTE — PROGRESS NOTES
05/10/22 1413   Quick Adds   Type of Visit Initial PT Evaluation       Present no   Living Environment   People in Home spouse   Current Living Arrangements house  (could stay on 1 level)   Home Accessibility stairs to enter home;stairs within home   Number of Stairs, Main Entrance 2;3   Stair Railings, Main Entrance railings on both sides of stairs   Self-Care   Usual Activity Tolerance excellent   Equipment Currently Used at Home other (see comments)  (has access to  canes and FWW)   Activity/Exercise/Self-Care Comment reecently  retired, driving, I   General Information   Onset of Illness/Injury or Date of Surgery 05/09/22   Referring Physician Dr. Warren Lopez   Patient/Family Therapy Goals Statement (PT) return home   Pertinent History of Current Problem (include personal factors and/or comorbidities that impact the POC) admit L hemothorax and rib fractures, L pleural effusion   Existing Precautions/Restrictions other (see comments)  (chest tube)   General Observations pt in bed reports feeling better since chest tube placed this am   Cognition   Affect/Mental Status (Cognition) WFL   Pain Assessment   Patient Currently in Pain   (some at site of chest tube)   Range of Motion (ROM)   ROM Comment BLE WFL   Strength (Manual Muscle Testing)   Strength Comments   (BLEs WFL)   Bed Mobility   Bed Mobility Limitations   (chest tube)   Assistive Device (Bed Mobility) other (see comments)  (HOB elevated)   Comment, (Bed Mobility) SBA   Transfers   Transfer Safety Concerns Noted   (pt on suction with chest tube)   Comment, (Transfers) SBA   Gait/Stairs (Locomotion)   [place] Level (Gait) supervision   Assistive Device (Gait) other (see comments)  (no AD)   Distance in Feet (Required for LE Total Joints) stood x5min   Balance   Balance Comments no LOB   Clinical Impression   Criteria for Skilled Therapeutic Intervention Yes, treatment indicated   PT Diagnosis (PT) limited activity    Influenced by the following impairments O2, chest tube   Functional limitations due to impairments decreased independent ambulation/stairs   Clinical Presentation (PT Evaluation Complexity) Evolving/Changing   Clinical Presentation Rationale per medical diagnosis   Clinical Decision Making (Complexity) low complexity   Planned Therapy Interventions (PT) gait training;stair training   Anticipated Equipment Needs at Discharge (PT) other (see comments)  (none)   PT Discharge Planning   PT Discharge Recommendation (DC Rec) home   PT Rationale for DC Rec pt SBA   Total Evaluation Time   Total Evaluation Time (Minutes) 12   Physical Therapy Goals   PT Frequency Daily   PT Predicted Duration/Target Date for Goal Attainment 05/13/22   PT Goals Gait;Stairs   PT: Gait Greater than 200 feet;Supervision/stand-by assist;Independent  (no AD)   PT: Stairs 3 stairs;Rail on both sides;Modified independent

## 2022-05-10 NOTE — CONSULTS
"Care Management Initial Consult    General Information  Assessment completed with: PatientDav  Type of CM/SW Visit: Initial Assessment    Primary Care Provider verified and updated as needed: Yes   Readmission within the last 30 days: no previous admission in last 30 days      Reason for Consult: discharge planning  Advance Care Planning: Advance Care Planning Reviewed: no concerns identified          Communication Assessment  Patient's communication style: spoken language (English or Bilingual)             Cognitive  Cognitive/Neuro/Behavioral:                        Living Environment:   People in home: spouse     Current living Arrangements: house      Able to return to prior arrangements: yes       Family/Social Support:  Care provided by: self  Provides care for: no one  Marital Status:   Wife  Za       Description of Support System: Supportive, Involved    Support Assessment: Adequate family and caregiver support, Adequate social supports, Patient communicates needs well met    Current Resources:   Patient receiving home care services: No     Community Resources: None  Equipment currently used at home: none  Supplies currently used at home: Other (\"glasses\")    Employment/Financial:  Employment Status:          Financial Concerns:             Lifestyle & Psychosocial Needs:  Social Determinants of Health     Tobacco Use: Medium Risk     Smoking Tobacco Use: Never Smoker     Smokeless Tobacco Use: Former User   Alcohol Use: Not on file   Financial Resource Strain: Not on file   Food Insecurity: Not on file   Transportation Needs: Not on file   Physical Activity: Not on file   Stress: Not on file   Social Connections: Not on file   Intimate Partner Violence: Not on file   Depression: Not on file   Housing Stability: Not on file       Functional Status:  Prior to admission patient needed assistance:   Dependent ADLs:: Independent, Ambulation-no assistive device  Dependent IADLs:: Independent   "     Mental Health Status:          Chemical Dependency Status:                Values/Beliefs:  Spiritual, Cultural Beliefs, Temple Practices, Values that affect care:                 Additional Information:  Dav lives in a house with his wife. He is independent with ADLs at baseline and drives.    Unknown discharge needs at this time.    Family to transport at discharge.    Trish Davis RN

## 2022-05-10 NOTE — CONSULTS
General Surgery Consultation  Dav Jane MRN# [id number]   Age/Sex: 67 year old male YOB: 1955     Reason for consult: 1. Hemothorax on left    2. Multiple fractures of ribs, left side, initial encounter for closed fracture    3. Hypoxia            Requesting physician: Bandar Zaragoza MD                   Assessment and Plan:   Assessment:  1.  Hemothorax  2.  Left sided - multiple rib fractures  3.  S/P fall  4.  SOB 2/2 #1    Plan:  -  Due to OR scheduling, unable to perform surgery, Left VATS, today.  Plan is to proceed with surgery for left VATS tmrw 5/11/22.  In the meantime, I have placed a left sided chest tube to help with drainage of the left hemothorax.  Please see the procedure note  -  Awaiting final read for s/p left sided chest tube placement.   -  Continue with oxygen as needed to prevent hypoxia.   - Continue left-sided chest tube to suction.  Monitor output.  -From the general surgery standpoint, the patient can have a diet as tolerated.  Patient will be n.p.o. at midnight for possible surgery in the morning.  -Repeat chest x-ray at 6 AM  -Surgical team following with you.          Chief Complaint:     Chief Complaint   Patient presents with     Fall     Rib Pain     Shortness of Breath        History is obtained from the patient    HPI:   Dav Jane is a 67 year old male who presents to the ED with complaints of SOB.  The patient was worked up and found to have a left sided hemothorax with multiple rib fractures.  The patient was admitted and general surgery team was consult to evaluate the patient.     On surgery evaluation, the patient was noted to have continued SOB.  The SOB is improving with nasal cannula oxygenation.  The patient states that he fell on his left side around [facility].  The patient did not seek out any medical attention.  He noticed that he had increased SOB and came in for further evaluation.  The patient had pain after experiencing the fall.  The pain  "has since improved. Denies any hematemesis.  Patient does admit to coughing and phlegm production.  This is also improved since the fall.          Past Medical History:   History reviewed. No pertinent past medical history.           Past Surgical History:     Past Surgical History:   Procedure Laterality Date     CT DENTAL SURGERY PROCEDURE N/A 7/16/2018    Procedure: Incision and Drainage of Submandibular Abscess with Extraction of Tooth 31;  Surgeon: Rasta Borja DDS;  Location: [facility] [place];  Service: Oral Surgery             Social History:    reports that he has never smoked. He has quit using smokeless tobacco.  His smokeless tobacco use included chew and chew. He reports current alcohol use of about 7.0 - 14.0 standard drinks of alcohol per week. He reports that he does not use drugs.           Family History:   History reviewed. No pertinent family history.           Allergies:     Allergies   Allergen Reactions     Pollen Extract Itching     Seasonal allergic rhinitis               Medications:     Prior to Admission medications    Not on File              Review of Systems:   The Review of Systems is negative other than noted in the HPI            Physical Exam:     Patient Vitals for the past 24 hrs:   BP Temp Temp src Pulse Resp SpO2 Height Weight   05/10/22 0858 (!) 158/95 -- -- 91 (!) 32 96 % -- --   05/10/22 0807 (!) 158/95 98.1  F (36.7  C) Oral 85 21 -- -- --   05/10/22 0416 (!) 173/88 98.8  F (37.1  C) Oral 83 29 95 % -- --   05/10/22 0007 (!) 148/84 98.7  F (37.1  C) Oral 86 29 96 % -- --   05/09/22 2100 (!) 176/99 99  F (37.2  C) Oral 94 (!) 34 98 % 1.778 m (5' 10\") 91.2 kg (201 lb)   05/09/22 2015 (!) 181/112 -- -- 96 29 97 % -- --   05/09/22 2000 (!) 154/90 -- -- 102 (!) 32 96 % -- --   05/09/22 1945 (!) 179/108 -- -- 103 (!) 31 96 % -- --   05/09/22 1930 (!) 163/92 -- -- 98 27 96 % -- --   05/09/22 1915 (!) 158/91 -- -- 99 24 97 % -- --   05/09/22 1900 (!) 177/98 -- -- 100 26 " 97 % -- --   05/09/22 1845 (!) 190/97 -- -- 99 27 97 % -- --   05/09/22 1830 (!) 157/89 -- -- 100 24 97 % -- --   05/09/22 1546 (!) 188/95 98.2  F (36.8  C) Oral 105 24 95 % -- 83.9 kg (185 lb)        No intake or output data in the 24 hours ending 05/10/22 1103   Constitutional:   awake, alert, cooperative, no apparent distress, and appears stated age       Eyes:   PERRL, conjunctiva/corneas clear, EOM's intact; no scleral edema or icterus noted        ENT:   Normocephalic, without obvious abnormality, atraumatic, Lips, mucosa, and tongue normal        Hematologic / Lymphatic:   No lymphadenopathy       Lungs:   Respiratory effort slightly increased from normal.  Patient is using mild accessory muscles.  Patient is on nasal cannula.  Decreased breath sounds on left side.    Nontender to palpation on the left chest.  Patient does have mild bruising over the left posterior portion of the thorax.       Cardiovascular:   Regular rate and rhythm       Abdomen:   Soft, nondistended, nontender to palpation       Musculoskeletal:   No obvious swelling, bruising or deformity       Skin:   Skin color and texture normal for patient, no rashes or lesions              Data:        All imaging studies reviewed by me.  I personally reviewed the imagings.  Patient does have a fluid collection of the left chest.    DO Colin Phillips DO  General Surgeon  [facility]  Surgery [address] [address]?  Office: [phone]  Employed by - [facility]  Pager: [phone]

## 2022-05-10 NOTE — PROGRESS NOTES
"  Interventional Radiology - Pre-Procedure Note:  5/10/2022    Procedure Requested: LEFT chest tube placement  Requested by: Parth JEFFRIES MD    Brief HPI: Dav Jane is a 67 year old male who suffered mechanical fall on Easter Sunday, 4/17, was started having worsening left-sided chest pain and progressive shortness of breath over the weekend, presented to ED where he had chest x-ray which showed rib fractures and large left pleural effusion and sent to ED, CT chest showed large left-sided hemothorax with multiple acute left-sided rib fractures and subsequently admitted for further management.     IMAGING:  CXR 5/10/2022:  IMPRESSION: On previous chest radiograph there is near complete opacification of the left hemithorax, now there is complete opacification with some slight mediastinal shift from left to right. Otherwise the exam is stable with again seen displaced left   rib fractures. No xena pneumothorax..    NPO: midnight.  ANTICOAGULANTS: none  ANTIBIOTICS: none    ALLERGIES  Allergies   Allergen Reactions     Pollen Extract Itching     Seasonal allergic rhinitis          LABS:  INR   Date Value Ref Range Status   05/10/2022 1.24 (H) 0.90 - 1.15 Final      Hemoglobin   Date Value Ref Range Status   05/10/2022 9.5 (L) 13.3 - 17.7 g/dL Final   ]  Platelet Count   Date Value Ref Range Status   05/10/2022 244 150 - 450 10e3/uL Final     Creatinine   Date Value Ref Range Status   05/10/2022 0.81 0.70 - 1.30 mg/dL Final     Potassium   Date Value Ref Range Status   05/10/2022 3.5 3.5 - 5.0 mmol/L Final         EXAM:  BP (!) 173/88   Pulse 83   Temp 98.8  F (37.1  C) (Oral)   Resp 29   Ht 1.778 m (5' 10\")   Wt 91.2 kg (201 lb)   SpO2 95%   BMI 28.84 kg/m    General:  Stable.  In no acute distress.    Neuro:  A&O x 3. Moves all extremities equally.  Resp:  Minimal breath sounds left upper chest, absent breath sounds anterior let chest. Right breath sounds clear to auscultation anteriorly.  Chest: Left " intra axillary area tenderness with palpation.  Cardio:  S1S2 and reg, without murmur, clicks or rubs    Code Status: FULL CODE      ASSESSMENT/PLAN:   68 yo male with large left-sided hemothorax with multiple acute left-sided rib fracture.    While image guided left chest tube placement is technically possibly for patient per review of imaging, possible thorascopic evacuation of hematoma and possible surgical chest tube placement, would likely be in patient's best interest as blood is likely old as initial injury occurred some time ago (4/17/22). Chest tube alone would likely require extensive time to drain and would also require tPA.     Patient reports discussing this also with surgery Dr. Ferguson and patient is in agreement with plan.    Attempted to page Dr. Lopez several time to discuss further.    Per chart review - surgery for left VATS scheduled for tomorrow 5/11/22 secondary to OR scheduling. In the meantime, Dr. Ferguson placed left sided chest tube to help with drainage of the left hemothorax.     Please contact IR if we can be of future assistance.    Total time spent on the date of the encounter is 30 minutes, including time spent counseling the patient, performing a medically appropriate evaluation, reviewing prior medical history, ordering medications and tests, documenting clinical information in the medical record, and communication of results.      Zayra Chang PA-C  Interventional Radiology

## 2022-05-10 NOTE — PROGRESS NOTES
Daily Progress Note    Assessment/Plan:  67 year old generally healthy male  who had a mechanical fall on Easter Sunday, 4/17, was started having worsening left-sided chest pain and progressive shortness of breath over the weekend, presented to ED where he had chest x-ray which showed rib fractures and large left pleural effusion and sent to ED, CT chest showed large left-sided hemothorax with multiple acute left-sided rib fractures and subsequently admitted for further management.    1.  Large left-sided hemothorax: Seen by surgery and chest tube placed.  4 L of bloody fluid was removed.  Repeat chest x-ray in the morning and if he has residual content they will do fluoroscopy for thrombus cleanout.  Otherwise if the hematoma is evacuated with chest tube would just continue chest tube drainage.  Management per surgery.    2.  Multiple rib fractures.  Acute displaced left lateral sixth through ninth rib fractures and posterior seventh ninth and 10th rib fractures near the CV vertebral junctions.  Rib fracture protocol ordered, surgery following.    3.  Pulmonary nodule: 4 mm nodule noted.  For low risk patients no follow-up needed, for high risk optional follow-up at 12 months.    4.  Acute blood loss anemia: On admission hemoglobin was 11.7.  This morning was 9.5.  We will repeat stat, transfuse for hemoglobin less than 7, consent already obtained by admitting physician.    5.  Acute chest wall pain secondary to trauma: Rib fracture protocol ordered.    6.  Hypertension: Not on meds, likely secondary to pain, will monitor.    Code status:Full Code        Barriers to Discharge: Chest tube    Disposition: Anticipate discharge in 2 to 3 days    Subjective:  Joao is new to me today, chart reviewed discussed with staff.  His wife is present.  He is sitting up in his chair with minimal pain and has no significant complaints.  Denies severe left rib cage pain, no shortness of breath.  No fevers or chills.  No abdominal  pain.  No lightheadedness or dizziness, skin does not appear pale.        Current Medications Reviewed via EHR List    Objective:  Vital signs in last 24 hours:  [unfilled]  .prog  Weight:   @THISENCWEIGHTS(1)@  Weight change:   Body mass index is 28.84 kg/m .    Intake/Output last 3 shifts:  No intake/output data recorded.  Intake/Output this shift:  No intake/output data recorded.    Physical Exam:  General: No apparent distress, sitting up in chair  CV: Regular rate and rhythm  Lungs: Clear with decreased breath sounds on left base  Abdomen: Soft, nontender        Imaging:  Personally Reviewed.  XR Chest 1 View    Result Date: 5/10/2022  EXAM: XR CHEST 1 VIEW LOCATION: [facility] DATE/TIME: 5/10/2022 5:43 AM INDICATION: Trauma Patient with Rib Fracture(s) COMPARISON: 05/09/2022     IMPRESSION: On previous chest radiograph there is near complete opacification of the left hemithorax, now there is complete opacification with some slight mediastinal shift from left to right. Otherwise the exam is stable with again seen displaced left rib fractures. No xena pneumothorax..    XR Chest Port 1 View    Result Date: 5/10/2022  EXAM: XR CHEST PORT 1 VIEW LOCATION: [facility] DATE/TIME: 5/10/2022 10:29 AM INDICATION: ct insertion COMPARISON: Radiograph 5/10/22 at 0530 hours     IMPRESSION: There is a left lateral approach large-bore chest tube. Pleural fluid has significantly decreased from the comparison study though has not completely resolved. No pneumothorax. Cannot assess for airspace disease at the left lung base. Normal size of the heart. Left rib fractures are noted.     CT Chest w Contrast    Result Date: 5/9/2022  EXAM: CT CHEST W CONTRAST LOCATION: [facility] DATE/TIME: 5/9/2022 5:25 PM INDICATION: fell 3 weeks ago with rib fx, now with large pleural effusion on CXR, please eval the fluid COMPARISON: Chest x-ray earlier today at 1328  TECHNIQUE: CT chest with IV contrast. Multiplanar reformats were obtained. Dose reduction techniques were used. CONTRAST: isovue 370 100ml FINDINGS: LUNGS AND PLEURA: There is a large left-sided pleural effusion with minimal aeration of the left upper lobe anteriorly. Rest of the left lung is collapsed. Most of this fluid is hypodense measuring 27 Hounsfield units. However, in the inferior and mid lung there are hyperdense acute areas of hemorrhage with a density of 63 Hounsfield units. No visible extravasation of contrast. No pneumothorax. There is a tiny subpleural 3 to 4 mm nodule in the right middle lobe (image 206). In addition, there are scattered, mild patchy groundglass opacities in the right upper lobe. MEDIASTINUM/AXILLAE: No adenopathy. No central pulmonary emboli. CORONARY ARTERY CALCIFICATION: Cannot evaluate. UPPER ABDOMEN: No liver or splenic laceration. Diffuse fatty infiltration of the liver. Incidental 1.2 cm simple cyst in the caudate lobe which needs no follow-up. Fatty atrophy of the pancreas. Imaged kidneys are unremarkable. Few colonic diverticuli. MUSCULOSKELETAL: There are acute, displaced left lateral sixth through ninth rib fractures and posterior seventh, ninth and 10th rib fractures near the costovertebral vertebral junctions. The lateral eighth rib fracture is the most displaced. No other acute fractures noted. There are two, small hemangiomas  in T11. No soft tissue hematomas.     IMPRESSION: 1.  Large left-sided hemothorax with acute hemorrhagic components suggesting recent bleed into the effusion. No pneumothorax. 2.  Multiple acute, left-sided rib fractures as noted above. 3.  Mild scattered groundglass opacities are noted in the right upper lobe with a 4 mm subpleural nodule middle lobe. Follow-up recommendations given below. 4.  No evidence of acute injury to the upper abdomen. 5.  Hepatic steatosis with incidental simple liver cyst. [Urgent Result: Large left hemothorax with  acute hemorrhagic components and multiple rib fractures. No pneumothorax. Needs a chest tube. Finding was identified on 5/9/2022 5:58 PM.  Bandar Goss was contacted by me on 5/9/2022 6:07 PM and verbalized understanding of the critical result. REFERENCE: Guidelines for Management of Incidental Pulmonary Nodules Detected on CT Images: From the Fleischner Society 2017. Guidelines apply to incidental nodules in patients who are 35 years or older. Guidelines do not apply to lung cancer screening, patients with immunosuppression, or patients with known primary cancer. SINGLE NODULE Nodule size <6 mm Low-risk patients: No follow-up needed. High-risk patients: Optional follow-up at 12 months.       Lab Results:  Personally Reviewed.   Fingerstick Blood Glucose: @MAVSBGB08TIG(POCGLUFGR:10)@    Last Hbg A1C: No results found for: HGBA1C   Lab Results   Component Value Date    INR 1.24 (H) 05/10/2022    INR 1.19 (H) 05/09/2022     Recent Results (from the past 24 hour(s))   CBC (+ platelets, no diff)    Collection Time: 05/09/22  4:00 PM   Result Value Ref Range    WBC Count 12.2 (H) 4.0 - 11.0 10e3/uL    RBC Count 3.33 (L) 4.40 - 5.90 10e6/uL    Hemoglobin 11.7 (L) 13.3 - 17.7 g/dL    Hematocrit 33.6 (L) 40.0 - 53.0 %     (H) 78 - 100 fL    MCH 35.1 (H) 26.5 - 33.0 pg    MCHC 34.8 31.5 - 36.5 g/dL    RDW 12.5 10.0 - 15.0 %    Platelet Count 317 150 - 450 10e3/uL   Basic metabolic panel    Collection Time: 05/09/22  4:00 PM   Result Value Ref Range    Sodium 133 (L) 136 - 145 mmol/L    Potassium 3.7 3.5 - 5.0 mmol/L    Chloride 96 (L) 98 - 107 mmol/L    Carbon Dioxide (CO2) 23 22 - 31 mmol/L    Anion Gap 14 5 - 18 mmol/L    Urea Nitrogen 10 8 - 22 mg/dL    Creatinine 0.78 0.70 - 1.30 mg/dL    Calcium 9.1 8.5 - 10.5 mg/dL    Glucose 130 (H) 70 - 125 mg/dL    GFR Estimate >90 >60 mL/min/1.73m2   Extra Red Top Tube    Collection Time: 05/09/22  4:00 PM   Result Value Ref Range    Hold Specimen JIC    Extra Green Top  (Lithium Heparin) Tube    Collection Time: 05/09/22  4:00 PM   Result Value Ref Range    Hold Specimen JIC    Extra Purple Top Tube    Collection Time: 05/09/22  4:00 PM   Result Value Ref Range    Hold Specimen JIC    Magnesium    Collection Time: 05/09/22  4:00 PM   Result Value Ref Range    Magnesium 2.1 1.8 - 2.6 mg/dL   Phosphorus    Collection Time: 05/09/22  4:00 PM   Result Value Ref Range    Phosphorus 2.8 2.5 - 4.5 mg/dL   Vitamin D Deficiency    Collection Time: 05/09/22  4:00 PM   Result Value Ref Range    Vitamin D, Total (25-Hydroxy) 49 20 - 75 ug/L   CT Chest w Contrast    Collection Time: 05/09/22  5:42 PM   Result Value Ref Range    Radiologist flags (Urgent)      Large left hemothorax with acute hemorrhagic components and multiple rib fractures. No pneumothorax. Needs a chest tube.   INR    Collection Time: 05/09/22  6:21 PM   Result Value Ref Range    INR 1.19 (H) 0.90 - 1.15   Asymptomatic COVID-19 Virus (Coronavirus) by PCR Nasopharyngeal    Collection Time: 05/09/22  7:58 PM    Specimen: Nasopharyngeal; Swab   Result Value Ref Range    SARS CoV2 PCR Negative Negative   Blood gas arterial    Collection Time: 05/10/22 12:08 AM   Result Value Ref Range    pH Arterial 7.41 7.37 - 7.44    pCO2 Arterial 39 35 - 45 mm Hg    pO2 Arterial 89 (H) 75 - 85 mm Hg    Bicarbonate Arterial 25 23 - 29 mmol/L    O2 Sat, Arterial 98.7 (H) 95.0 - 96.0 %    Oxyhemoglobin 96.5 (H) 95.0 - 96.0 %    Base Excess/Deficit (+/-) 0.4   mmol/L    Ventilation Mode nasal cannula     Flow 3.0 LPM    Sample Stabilized Temperature 37.0 degrees C   Comprehensive metabolic panel    Collection Time: 05/10/22  4:13 AM   Result Value Ref Range    Sodium 134 (L) 136 - 145 mmol/L    Potassium 3.5 3.5 - 5.0 mmol/L    Chloride 100 98 - 107 mmol/L    Carbon Dioxide (CO2) 22 22 - 31 mmol/L    Anion Gap 12 5 - 18 mmol/L    Urea Nitrogen 12 8 - 22 mg/dL    Creatinine 0.81 0.70 - 1.30 mg/dL    Calcium 7.7 (L) 8.5 - 10.5 mg/dL    Glucose 112  70 - 125 mg/dL    Alkaline Phosphatase 82 45 - 120 U/L    AST 28 0 - 40 U/L    ALT 25 0 - 45 U/L    Protein Total 6.1 6.0 - 8.0 g/dL    Albumin 2.2 (L) 3.5 - 5.0 g/dL    Bilirubin Total 0.6 0.0 - 1.0 mg/dL    GFR Estimate >90 >60 mL/min/1.73m2   CBC with platelets    Collection Time: 05/10/22  4:13 AM   Result Value Ref Range    WBC Count 8.0 4.0 - 11.0 10e3/uL    RBC Count 2.79 (L) 4.40 - 5.90 10e6/uL    Hemoglobin 9.5 (L) 13.3 - 17.7 g/dL    Hematocrit 28.6 (L) 40.0 - 53.0 %     (H) 78 - 100 fL    MCH 34.1 (H) 26.5 - 33.0 pg    MCHC 33.2 31.5 - 36.5 g/dL    RDW 12.5 10.0 - 15.0 %    Platelet Count 244 150 - 450 10e3/uL   INR    Collection Time: 05/10/22  4:13 AM   Result Value Ref Range    INR 1.24 (H) 0.90 - 1.15   Adult Type and Screen    Collection Time: 05/10/22  4:13 AM   Result Value Ref Range    ABO/RH(D) A POS     Antibody Screen Negative Negative    SPECIMEN EXPIRATION DATE [id number]            Advanced Care Planning    Time > 35 minutes with greater than 50% of time spent in counseling and coordination of care.    Beni Lopez MD  Date: 5/10/2022  Time: 3:14 PM  [facility]

## 2022-05-10 NOTE — PROGRESS NOTES
Care Management Follow Up    Length of Stay (days): 1    Expected Discharge Date: 05/13/2022     Concerns to be Addressed:     Medical progression. Surgical Clearance-chest tube placed today 5/10.   Patient plan of care discussed at interdisciplinary rounds: Yes    Anticipated Discharge Disposition:  Home     Anticipated Discharge Services:  To be determined pending progression and recommendations.   Anticipated Discharge DME:  To be determined closer to time of discharge.     Patient/family educated on Medicare website which has current facility and service quality ratings:  Not needed at this time  Education Provided on the Discharge Plan:  Yes, per team  Patient/Family in Agreement with the Plan:  yes    Referrals Placed by CM/SW:  None at this time   Private pay costs discussed: Not applicable    Additional Information:  Chart reviewed. From home with spouse; independent at baseline, is able to drive. Discharge plan pending progression and recommendations. Family to transport. Care manager to follow.       Elsi Price RN

## 2022-05-10 NOTE — PROGRESS NOTES
General surgery         ASSESSMENT     1. Hemothorax on left    2. Multiple fractures of ribs, left side, initial encounter for closed fracture    3. Hypoxia    4.      Chest tube has been placed on the left side and patient's put out over 4 L of bloody fluid         PLAN      Repeat chest x-ray in the morning if he has residual content within the left chest and then we will move forward with a thoracoscopy for thrombus cleanout.  If the chest tube has done a sufficient job in evacuating the hematoma then we may just continue with chest tube drainage and not need to do a thoracoscopy.         CHIEF COMPLAINT     Chief Complaint   Patient presents with     Fall     Rib Pain     Shortness of Breath         HPI     Dav Jane is a 67 year old male who presents after falling down the stairs 3 weeks ago he had progressive shortness of breath and pain in his left chest for which she came in to be evaluated.  It was noted on chest x-ray that he had complete opacification of his left chest from a large hemothorax.  He was admitted last night and today a large bore chest tube was placed getting out 4 L of of bloody fluid.  I opted for chest tube placement because we could not get access to the operating room for several hours but I had a very good return with the chest tube placement.         PAST MEDICAL HISTORY SURGICAL HISTORY     History reviewed. No pertinent past medical history. Past Surgical History:   Procedure Laterality Date     [place] DENTAL SURGERY PROCEDURE N/A 7/16/2018    Procedure: Incision and Drainage of Submandibular Abscess with Extraction of Tooth 31;  Surgeon: Rasta Borja DDS;  Location: [facility];  Service: Oral Surgery          CURRENT MEDICATIONS ALLERGIES     No current outpatient medications on file.    Allergies   Allergen Reactions     Pollen Extract Itching     Seasonal allergic rhinitis           FAMILY HISTORY   History reviewed. No pertinent family  "history.      SOCIAL HISTORY     Social History     Socioeconomic History     Marital status:      Spouse name: None     Number of children: None     Years of education: None     Highest education level: None   Tobacco Use     Smoking status: Never Smoker     Smokeless tobacco: Former User     Types: Chew, Chew   Substance and Sexual Activity     Alcohol use: Yes     Alcohol/week: 7.0 - 14.0 standard drinks     Comment: Alcoholic Drinks/day: 7-14 cans a week     Drug use: No         REVIEW OF SYSTEMS       12 point review of systems are unremarkable except for the symptoms described in the HPI    PHYSICAL EXAM     VITAL SIGNS: /81 (BP Location: Left arm)   Pulse 86   Temp 98.2  F (36.8  C) (Oral)   Resp 22   Ht 1.778 m (5' 10\")   Wt 91.2 kg (201 lb)   SpO2 95%   BMI 28.84 kg/m     General : Alert, cooperative, appears stated age   Skin: Skin color, texture, turgor normal, no rashes or lesions   Lymph nodes: No obvious adenopathy   Head: Normocephalic, without obvious abnormality, atraumatic   HEENT:  conjunctiva/corneas clear, EOM's intact, no scleral icterus   Throat: Lips, mucosa, and tongue normal; teeth and gums normal    Neck: Supple, thyroid not enlarged   Back: No CVA tenderness   Lungs: Clear to auscultation bilaterally, respirations unlabored, no rales, rhonchi or wheezes   Chest Wall: Patient has left-sided rib fractures in this area is tender to palpation, left-sided chest tube in place with bloody fluid draining from the left chest  Heart: Regular rate and rhythm, S1, S2 normal, no murmur, rub or gallop   Abdomen: Soft, non-tender, no guarding, + bowel sounds active,   Extremities : No obvious swelling,  Neurologic: Cranial Nerves II-XII normal, moves all extremities equally, no focal findings          RADIOLOGY      XR Chest Port 1 View   Final Result   IMPRESSION: There is a left lateral approach large-bore chest tube. Pleural fluid has significantly decreased from the comparison " study though has not completely resolved. No pneumothorax. Cannot assess for airspace disease at the left lung base. Normal    size of the heart. Left rib fractures are noted.       XR Chest 1 View   Final Result   IMPRESSION: On previous chest radiograph there is near complete opacification of the left hemithorax, now there is complete opacification with some slight mediastinal shift from left to right. Otherwise the exam is stable with again seen displaced left    rib fractures. No xena pneumothorax..      CT Chest w Contrast   Final Result   Abnormal   IMPRESSION:    1.  Large left-sided hemothorax with acute hemorrhagic components suggesting recent bleed into the effusion. No pneumothorax.   2.  Multiple acute, left-sided rib fractures as noted above.   3.  Mild scattered groundglass opacities are noted in the right upper lobe with a 4 mm subpleural nodule middle lobe. Follow-up recommendations given below.   4.  No evidence of acute injury to the upper abdomen.   5.  Hepatic steatosis with incidental simple liver cyst.         [Urgent Result: Large left hemothorax with acute hemorrhagic components and multiple rib fractures. No pneumothorax. Needs a chest tube.      Finding was identified on 5/9/2022 5:58 PM.        Bandar Goss was contacted by me on 5/9/2022 6:07 PM and verbalized understanding of the critical result.      REFERENCE:   Guidelines for Management of Incidental Pulmonary Nodules Detected on CT Images: From the Fleischner Society 2017.    Guidelines apply to incidental nodules in patients who are 35 years or older.   Guidelines do not apply to lung cancer screening, patients with immunosuppression, or patients with known primary cancer.      SINGLE NODULE   Nodule size <6 mm   Low-risk patients: No follow-up needed.   High-risk patients: Optional follow-up at 12 months.         XR Chest 1 View    (Results Pending)       EKG     Reviewed        LABS     Results for orders placed or performed  during the hospital encounter of 05/09/22   CT Chest w Contrast   Result Value Ref Range    Radiologist flags (Urgent)      Large left hemothorax with acute hemorrhagic components and multiple rib fractures. No pneumothorax. Needs a chest tube.    Impression    IMPRESSION:   1.  Large left-sided hemothorax with acute hemorrhagic components suggesting recent bleed into the effusion. No pneumothorax.  2.  Multiple acute, left-sided rib fractures as noted above.  3.  Mild scattered groundglass opacities are noted in the right upper lobe with a 4 mm subpleural nodule middle lobe. Follow-up recommendations given below.  4.  No evidence of acute injury to the upper abdomen.  5.  Hepatic steatosis with incidental simple liver cyst.      [Urgent Result: Large left hemothorax with acute hemorrhagic components and multiple rib fractures. No pneumothorax. Needs a chest tube.    Finding was identified on 5/9/2022 5:58 PM.      Bandar Goss was contacted by me on 5/9/2022 6:07 PM and verbalized understanding of the critical result.    REFERENCE:  Guidelines for Management of Incidental Pulmonary Nodules Detected on CT Images: From the Fleischner Society 2017.   Guidelines apply to incidental nodules in patients who are 35 years or older.  Guidelines do not apply to lung cancer screening, patients with immunosuppression, or patients with known primary cancer.    SINGLE NODULE  Nodule size <6 mm  Low-risk patients: No follow-up needed.  High-risk patients: Optional follow-up at 12 months.     XR Chest 1 View    Impression    IMPRESSION: On previous chest radiograph there is near complete opacification of the left hemithorax, now there is complete opacification with some slight mediastinal shift from left to right. Otherwise the exam is stable with again seen displaced left   rib fractures. No xena pneumothorax..   XR Chest Port 1 View    Impression    IMPRESSION: There is a left lateral approach large-bore chest tube. Pleural  fluid has significantly decreased from the comparison study though has not completely resolved. No pneumothorax. Cannot assess for airspace disease at the left lung base. Normal   size of the heart. Left rib fractures are noted.    CBC (+ platelets, no diff)   Result Value Ref Range    WBC Count 12.2 (H) 4.0 - 11.0 10e3/uL    RBC Count 3.33 (L) 4.40 - 5.90 10e6/uL    Hemoglobin 11.7 (L) 13.3 - 17.7 g/dL    Hematocrit 33.6 (L) 40.0 - 53.0 %     (H) 78 - 100 fL    MCH 35.1 (H) 26.5 - 33.0 pg    MCHC 34.8 31.5 - 36.5 g/dL    RDW 12.5 10.0 - 15.0 %    Platelet Count 317 150 - 450 10e3/uL   Basic metabolic panel   Result Value Ref Range    Sodium 133 (L) 136 - 145 mmol/L    Potassium 3.7 3.5 - 5.0 mmol/L    Chloride 96 (L) 98 - 107 mmol/L    Carbon Dioxide (CO2) 23 22 - 31 mmol/L    Anion Gap 14 5 - 18 mmol/L    Urea Nitrogen 10 8 - 22 mg/dL    Creatinine 0.78 0.70 - 1.30 mg/dL    Calcium 9.1 8.5 - 10.5 mg/dL    Glucose 130 (H) 70 - 125 mg/dL    GFR Estimate >90 >60 mL/min/1.73m2   Extra Red Top Tube   Result Value Ref Range    Hold Specimen JIC    Extra Green Top (Lithium Heparin) Tube   Result Value Ref Range    Hold Specimen JIC    Extra Purple Top Tube   Result Value Ref Range    Hold Specimen JIC    Result Value Ref Range    INR 1.19 (H) 0.90 - 1.15   Asymptomatic COVID-19 Virus (Coronavirus) by PCR Nasopharyngeal    Specimen: Nasopharyngeal; Swab   Result Value Ref Range    SARS CoV2 PCR Negative Negative   Blood gas arterial   Result Value Ref Range    pH Arterial 7.41 7.37 - 7.44    pCO2 Arterial 39 35 - 45 mm Hg    pO2 Arterial 89 (H) 75 - 85 mm Hg    Bicarbonate Arterial 25 23 - 29 mmol/L    O2 Sat, Arterial 98.7 (H) 95.0 - 96.0 %    Oxyhemoglobin 96.5 (H) 95.0 - 96.0 %    Base Excess/Deficit (+/-) 0.4   mmol/L    Ventilation Mode nasal cannula     Flow 3.0 LPM    Sample Stabilized Temperature 37.0 degrees C   Result Value Ref Range    Magnesium 2.1 1.8 - 2.6 mg/dL   Result Value Ref Range    Phosphorus  2.8 2.5 - 4.5 mg/dL   Vitamin D Deficiency   Result Value Ref Range    Vitamin D, Total (25-Hydroxy) 49 20 - 75 ug/L   Comprehensive metabolic panel   Result Value Ref Range    Sodium 134 (L) 136 - 145 mmol/L    Potassium 3.5 3.5 - 5.0 mmol/L    Chloride 100 98 - 107 mmol/L    Carbon Dioxide (CO2) 22 22 - 31 mmol/L    Anion Gap 12 5 - 18 mmol/L    Urea Nitrogen 12 8 - 22 mg/dL    Creatinine 0.81 0.70 - 1.30 mg/dL    Calcium 7.7 (L) 8.5 - 10.5 mg/dL    Glucose 112 70 - 125 mg/dL    Alkaline Phosphatase 82 45 - 120 U/L    AST 28 0 - 40 U/L    ALT 25 0 - 45 U/L    Protein Total 6.1 6.0 - 8.0 g/dL    Albumin 2.2 (L) 3.5 - 5.0 g/dL    Bilirubin Total 0.6 0.0 - 1.0 mg/dL    GFR Estimate >90 >60 mL/min/1.73m2   CBC with platelets   Result Value Ref Range    WBC Count 8.0 4.0 - 11.0 10e3/uL    RBC Count 2.79 (L) 4.40 - 5.90 10e6/uL    Hemoglobin 9.5 (L) 13.3 - 17.7 g/dL    Hematocrit 28.6 (L) 40.0 - 53.0 %     (H) 78 - 100 fL    MCH 34.1 (H) 26.5 - 33.0 pg    MCHC 33.2 31.5 - 36.5 g/dL    RDW 12.5 10.0 - 15.0 %    Platelet Count 244 150 - 450 10e3/uL   Result Value Ref Range    INR 1.24 (H) 0.90 - 1.15   Adult Type and Screen   Result Value Ref Range    ABO/RH(D) A POS     Antibody Screen Negative Negative    SPECIMEN EXPIRATION DATE [id number]            [facility]  [phone]

## 2022-05-10 NOTE — PROGRESS NOTES
CLINICAL NUTRITION SERVICES - ASSESSMENT NOTE     Nutrition Prescription    RECOMMENDATIONS FOR MDs/PROVIDERS TO ORDER:  None    Malnutrition Status:    Patient does not meet two of the established criteria necessary for diagnosing malnutrition    Recommendations already ordered by Registered Dietitian (RD):  None    Future/Additional Recommendations:  Monitor PO intake, weight trends, and future need for supplement     REASON FOR ASSESSMENT  Dav Jane is a/an 67 year old male assessed by the dietitian for Provider Order - Trauma patient with multiple rib fractures    NUTRITION HISTORY  PMH: no significant past medical history, had a mechanical fall on Easter Sunday, 4/17, was started having worsening left-sided chest pain and progressive shortness of breath over the weekend, presented to ED where he had chest x-ray which showed rib fractures and large left pleural effusion and sent to ED, CT chest showed large left-sided hemothorax with multiple acute left-sided rib fractures and subsequently admitted for further management.  Surgery scheduled for 5/11. Patient has chest tube 5/10.    Patient reports that he follows a vegetarian diet but eats fish and eggs. He incorporates many other protein sources into his meals such as beans, nuts, and impossible meat. We discussed the importance of eating protein for healing. He said that he had lunch today and will continue to eat meals TID unless his doctor orders an NPO diet. He has a good appetite and eats 100% of his meals. He likes to stay active when he can by biking and walking. He says he would like to get more active once he recovers from his fall. He states that his usual weight is around 175-180 lbs. We also discussed supplement options available to him during his hospital stay. He does not want any supplements at this time.    CURRENT NUTRITION ORDERS  Diet: Regular  Intake/Tolerance: 100% intake and good appetite    LABS  Na 134(L), Ca  "7.7(L)    MEDICATIONS  Protonix, senokot    ANTHROPOMETRICS  Height: 177.8 cm (5' 10\")  Most Recent Weight: 91.2 kg (201 lb)    IBW: 75 kg  BMI: Overweight BMI 25-29.9  Weight History: no current weights on record since 2018  Wt Readings from Last 10 Encounters:   05/09/22 91.2 kg (201 lb)   07/18/18 83.7 kg (184 lb 9.6 oz)   Dosing Weight: 79 kg adjusted weight    ASSESSED NUTRITION NEEDS  Estimated Energy Needs: 7431-4703 kcals/day (25 - 30 kcals/kg)  Justification: Maintenance  Estimated Protein Needs: 79-95 grams protein/day (1 - 1.2 grams of pro/kg)  Justification: Wound healing  Estimated Fluid Needs:  (1 mL/kcal)   Justification: Maintenance    MALNUTRITION  % Intake: No decreased intake noted  % Weight Loss: Unable to assess d/t lack of weight history  Subcutaneous Fat Loss: None observed  Muscle Loss: None observed  Fluid Accumulation/Edema: None noted  Malnutrition Diagnosis: Patient does not meet two of the established criteria necessary for diagnosing malnutrition    NUTRITION DIAGNOSIS  Increased protein needs related to wound healing as evidenced by rib fractures and surgery scheduled for 5/11.    INTERVENTIONS  Implementation  None at this time.    Goals  Patient to consume % of nutritionally adequate meal trays TID, or the equivalent with supplements/snacks.     Monitoring/Evaluation  Progress toward goals will be monitored and evaluated per protocol.  Shobha Priest  Dietetic Intern     "

## 2022-05-11 ENCOUNTER — APPOINTMENT (OUTPATIENT)
Dept: PHYSICAL THERAPY | Facility: HOSPITAL | Age: 67
DRG: 199 | End: 2022-05-11
Payer: COMMERCIAL

## 2022-05-11 ENCOUNTER — APPOINTMENT (OUTPATIENT)
Dept: RADIOLOGY | Facility: HOSPITAL | Age: 67
DRG: 199 | End: 2022-05-11
Attending: INTERNAL MEDICINE
Payer: COMMERCIAL

## 2022-05-11 LAB
ANION GAP SERPL CALCULATED.3IONS-SCNC: 10 MMOL/L (ref 5–18)
BUN SERPL-MCNC: 11 MG/DL (ref 8–22)
CALCIUM SERPL-MCNC: 8 MG/DL (ref 8.5–10.5)
CHLORIDE BLD-SCNC: 102 MMOL/L (ref 98–107)
CO2 SERPL-SCNC: 26 MMOL/L (ref 22–31)
CREAT SERPL-MCNC: 0.77 MG/DL (ref 0.7–1.3)
ERYTHROCYTE [DISTWIDTH] IN BLOOD BY AUTOMATED COUNT: 12.1 % (ref 10–15)
GFR SERPL CREATININE-BSD FRML MDRD: >90 ML/MIN/1.73M2
GLUCOSE BLD-MCNC: 124 MG/DL (ref 70–125)
HCT VFR BLD AUTO: 30.7 % (ref 40–53)
HGB BLD-MCNC: 10.4 G/DL (ref 13.3–17.7)
MCH RBC QN AUTO: 34.3 PG (ref 26.5–33)
MCHC RBC AUTO-ENTMCNC: 33.9 G/DL (ref 31.5–36.5)
MCV RBC AUTO: 101 FL (ref 78–100)
PLATELET # BLD AUTO: 280 10E3/UL (ref 150–450)
POTASSIUM BLD-SCNC: 3.7 MMOL/L (ref 3.5–5)
RBC # BLD AUTO: 3.03 10E6/UL (ref 4.4–5.9)
SODIUM SERPL-SCNC: 138 MMOL/L (ref 136–145)
WBC # BLD AUTO: 7.3 10E3/UL (ref 4–11)

## 2022-05-11 PROCEDURE — 80048 BASIC METABOLIC PNL TOTAL CA: CPT | Performed by: INTERNAL MEDICINE

## 2022-05-11 PROCEDURE — 250N000013 HC RX MED GY IP 250 OP 250 PS 637: Performed by: INTERNAL MEDICINE

## 2022-05-11 PROCEDURE — 97116 GAIT TRAINING THERAPY: CPT | Mod: GP

## 2022-05-11 PROCEDURE — 999N000157 HC STATISTIC RCP TIME EA 10 MIN

## 2022-05-11 PROCEDURE — C9113 INJ PANTOPRAZOLE SODIUM, VIA: HCPCS | Performed by: INTERNAL MEDICINE

## 2022-05-11 PROCEDURE — 250N000011 HC RX IP 250 OP 636: Performed by: INTERNAL MEDICINE

## 2022-05-11 PROCEDURE — 99231 SBSQ HOSP IP/OBS SF/LOW 25: CPT | Performed by: SURGERY

## 2022-05-11 PROCEDURE — 94150 VITAL CAPACITY TEST: CPT

## 2022-05-11 PROCEDURE — 71045 X-RAY EXAM CHEST 1 VIEW: CPT

## 2022-05-11 PROCEDURE — 85027 COMPLETE CBC AUTOMATED: CPT | Performed by: EMERGENCY MEDICINE

## 2022-05-11 PROCEDURE — 99233 SBSQ HOSP IP/OBS HIGH 50: CPT | Performed by: EMERGENCY MEDICINE

## 2022-05-11 PROCEDURE — 120N000004 HC R&B MS OVERFLOW

## 2022-05-11 PROCEDURE — 36415 COLL VENOUS BLD VENIPUNCTURE: CPT | Performed by: INTERNAL MEDICINE

## 2022-05-11 RX ORDER — OXYCODONE HYDROCHLORIDE 5 MG/1
5 TABLET ORAL EVERY 4 HOURS PRN
Status: CANCELLED | OUTPATIENT
Start: 2022-05-11

## 2022-05-11 RX ORDER — ONDANSETRON 4 MG/1
4 TABLET, ORALLY DISINTEGRATING ORAL EVERY 30 MIN PRN
Status: CANCELLED | OUTPATIENT
Start: 2022-05-11

## 2022-05-11 RX ORDER — ONDANSETRON 2 MG/ML
4 INJECTION INTRAMUSCULAR; INTRAVENOUS EVERY 30 MIN PRN
Status: CANCELLED | OUTPATIENT
Start: 2022-05-11

## 2022-05-11 RX ORDER — FENTANYL CITRATE 50 UG/ML
25 INJECTION, SOLUTION INTRAMUSCULAR; INTRAVENOUS EVERY 5 MIN PRN
Status: CANCELLED | OUTPATIENT
Start: 2022-05-11

## 2022-05-11 RX ORDER — LIDOCAINE 40 MG/G
CREAM TOPICAL
Status: CANCELLED | OUTPATIENT
Start: 2022-05-11

## 2022-05-11 RX ORDER — SODIUM CHLORIDE, SODIUM LACTATE, POTASSIUM CHLORIDE, CALCIUM CHLORIDE 600; 310; 30; 20 MG/100ML; MG/100ML; MG/100ML; MG/100ML
INJECTION, SOLUTION INTRAVENOUS CONTINUOUS
Status: CANCELLED | OUTPATIENT
Start: 2022-05-11

## 2022-05-11 RX ORDER — HYDROMORPHONE HCL IN WATER/PF 6 MG/30 ML
0.2 PATIENT CONTROLLED ANALGESIA SYRINGE INTRAVENOUS EVERY 5 MIN PRN
Status: CANCELLED | OUTPATIENT
Start: 2022-05-11

## 2022-05-11 RX ADMIN — LIDOCAINE 1 PATCH: 246 PATCH TOPICAL at 21:09

## 2022-05-11 RX ADMIN — PANTOPRAZOLE SODIUM 40 MG: 40 INJECTION, POWDER, FOR SOLUTION INTRAVENOUS at 08:28

## 2022-05-11 RX ADMIN — ACETAMINOPHEN 975 MG: 325 TABLET ORAL at 14:25

## 2022-05-11 RX ADMIN — SENNOSIDES AND DOCUSATE SODIUM 1 TABLET: 8.6; 5 TABLET ORAL at 08:29

## 2022-05-11 RX ADMIN — ACETAMINOPHEN 975 MG: 325 TABLET ORAL at 05:39

## 2022-05-11 RX ADMIN — GABAPENTIN 300 MG: 300 CAPSULE ORAL at 14:26

## 2022-05-11 RX ADMIN — SENNOSIDES AND DOCUSATE SODIUM 1 TABLET: 8.6; 5 TABLET ORAL at 21:09

## 2022-05-11 RX ADMIN — GABAPENTIN 300 MG: 300 CAPSULE ORAL at 08:29

## 2022-05-11 RX ADMIN — GABAPENTIN 300 MG: 300 CAPSULE ORAL at 21:08

## 2022-05-11 RX ADMIN — OXYCODONE HYDROCHLORIDE 5 MG: 5 TABLET ORAL at 08:28

## 2022-05-11 RX ADMIN — LIDOCAINE 1 PATCH: 246 PATCH TOPICAL at 14:28

## 2022-05-11 RX ADMIN — ACETAMINOPHEN 975 MG: 325 TABLET ORAL at 21:08

## 2022-05-11 ASSESSMENT — ACTIVITIES OF DAILY LIVING (ADL)
ADLS_ACUITY_SCORE: 20

## 2022-05-11 NOTE — PROGRESS NOTES
Daily Progress Note    Assessment/Plan:  67 year old generally healthy male  who had a mechanical fall on Easter Sunday, 4/17, was started having worsening left-sided chest pain and progressive shortness of breath over the weekend, presented to ED where he had chest x-ray which showed rib fractures and large left pleural effusion and sent to ED, CT chest showed large left-sided hemothorax with multiple acute left-sided rib fractures and subsequently admitted for further management.     1.  Large left-sided hemothorax: Seen by surgery and chest tube placed.  4 L of bloody fluid was removed.  Repeat chest x-ray reassuring and at this point no need for VATS.   Currently on waterseal with daily chest x-rays. Management per surgery.     2.  Multiple rib fractures.  Acute displaced left lateral sixth through ninth rib fractures and posterior seventh ninth and 10th rib fractures near the CV vertebral junctions.  Rib fracture protocol ordered, surgery following.  Pain has been very well controlled.  He has been ambulating and even doing stairs with well-controlled pain.     3.  Pulmonary nodule: 4 mm nodule noted.  For low risk patients no follow-up needed, for high risk optional follow-up at 12 months.     4.  Acute blood loss anemia: On admission hemoglobin was 11.7.    Hemoglobin stable at 10.4.   Recommend transfuse for hemoglobin less than 7, consent already obtained by admitting physician.     5.  Acute chest wall pain secondary to trauma: Rib fracture protocol ordered.  Pain is very well controlled.     6.  Hypertension: Not on meds, likely secondary to pain, will monitor.  Seems to be improving with pain control.    Code status:Full Code        Barriers to Discharge: Chest tube    Disposition: Anticipate discharge in 2 to 3 days    Subjective:  Joao is sitting up in a chair.  Pain is very well controlled.  Reports he has been ambulating and even doing steps.  He is now off of oxygen.  Chest tube is to waterseal.  He  denies anterior chest pain or dyspnea, no nausea vomiting or diarrhea, no swollen extremities.        Current Medications Reviewed via EHR List    Objective:  Vital signs in last 24 hours:  [unfilled]  .prog  Weight:   @THISENCWEIGHTS(1)@  Weight change:   Body mass index is 28.84 kg/m .    Intake/Output last 3 shifts:  I/O last 3 completed shifts:  In: 1728.33 [P.O.:780; I.V.:948.33]  Out: 2132 [Urine:2000; Chest Tube:132]  Intake/Output this shift:  No intake/output data recorded.    Physical Exam:  General: No apparent distress, pleasant and conversant  CV: Regular rate and rhythm  Lungs: Crackles at left base  Abdomen: Soft, nontender        Imaging:  Personally Reviewed.  XR Chest 1 View    Result Date: 5/10/2022  EXAM: XR CHEST 1 VIEW LOCATION: [facility] DATE/TIME: 5/10/2022 2:39 PM INDICATION: Follow-up left hemothorax. COMPARISON: 05/10/2022 1029 hours and 0528 hours     IMPRESSION: Left chest tube unchanged position. Increased patchy opacity within the left midlung, with unchanged left basilar opacity. Right lung clear. No pneumothorax.    XR Chest 1 View    Result Date: 5/10/2022  EXAM: XR CHEST 1 VIEW LOCATION: [facility] DATE/TIME: 5/10/2022 5:43 AM INDICATION: Trauma Patient with Rib Fracture(s) COMPARISON: 05/09/2022     IMPRESSION: On previous chest radiograph there is near complete opacification of the left hemithorax, now there is complete opacification with some slight mediastinal shift from left to right. Otherwise the exam is stable with again seen displaced left rib fractures. No xena pneumothorax..    XR Chest Port 1 View    Result Date: 5/11/2022  EXAM: XR CHEST PORT 1 VIEW LOCATION: [facility] DATE/TIME: 5/11/2022 5:28 AM INDICATION: Trauma Patient with Rib Fracture(s) COMPARISON: 05/10/2022     IMPRESSION: The chest tube has changed position and in the distal aspect of the left chest tube is now projected over the  inferior aspect of the left hemithorax, otherwise I do not appreciate any other significant changes with stability of the infiltrate/atelectasis seen primarily in the mid and lower portions of the left hemithorax with adjacent acute multilevel level left rib fractures. No xena pneumothorax is seen.    XR Chest Port 1 View    Result Date: 5/10/2022  EXAM: XR CHEST PORT 1 VIEW LOCATION: [facility] DATE/TIME: 5/10/2022 10:29 AM INDICATION: ct insertion COMPARISON: Radiograph 5/10/22 at 0530 hours     IMPRESSION: There is a left lateral approach large-bore chest tube. Pleural fluid has significantly decreased from the comparison study though has not completely resolved. No pneumothorax. Cannot assess for airspace disease at the left lung base. Normal size of the heart. Left rib fractures are noted.     CT Chest w Contrast    Result Date: 5/9/2022  EXAM: CT CHEST W CONTRAST LOCATION: [facility] DATE/TIME: 5/9/2022 5:25 PM INDICATION: fell 3 weeks ago with rib fx, now with large pleural effusion on CXR, please eval the fluid COMPARISON: Chest x-ray earlier today at 1328 TECHNIQUE: CT chest with IV contrast. Multiplanar reformats were obtained. Dose reduction techniques were used. CONTRAST: isovue 370 100ml FINDINGS: LUNGS AND PLEURA: There is a large left-sided pleural effusion with minimal aeration of the left upper lobe anteriorly. Rest of the left lung is collapsed. Most of this fluid is hypodense measuring 27 Hounsfield units. However, in the inferior and mid lung there are hyperdense acute areas of hemorrhage with a density of 63 Hounsfield units. No visible extravasation of contrast. No pneumothorax. There is a tiny subpleural 3 to 4 mm nodule in the right middle lobe (image 206). In addition, there are scattered, mild patchy groundglass opacities in the right upper lobe. MEDIASTINUM/AXILLAE: No adenopathy. No central pulmonary emboli. CORONARY ARTERY CALCIFICATION:  Cannot evaluate. UPPER ABDOMEN: No liver or splenic laceration. Diffuse fatty infiltration of the liver. Incidental 1.2 cm simple cyst in the caudate lobe which needs no follow-up. Fatty atrophy of the pancreas. Imaged kidneys are unremarkable. Few colonic diverticuli. MUSCULOSKELETAL: There are acute, displaced left lateral sixth through ninth rib fractures and posterior seventh, ninth and 10th rib fractures near the costovertebral vertebral junctions. The lateral eighth rib fracture is the most displaced. No other acute fractures noted. There are two, small hemangiomas  in T11. No soft tissue hematomas.     IMPRESSION: 1.  Large left-sided hemothorax with acute hemorrhagic components suggesting recent bleed into the effusion. No pneumothorax. 2.  Multiple acute, left-sided rib fractures as noted above. 3.  Mild scattered groundglass opacities are noted in the right upper lobe with a 4 mm subpleural nodule middle lobe. Follow-up recommendations given below. 4.  No evidence of acute injury to the upper abdomen. 5.  Hepatic steatosis with incidental simple liver cyst. [Urgent Result: Large left hemothorax with acute hemorrhagic components and multiple rib fractures. No pneumothorax. Needs a chest tube. Finding was identified on 5/9/2022 5:58 PM.  Bandar Goss was contacted by me on 5/9/2022 6:07 PM and verbalized understanding of the critical result. REFERENCE: Guidelines for Management of Incidental Pulmonary Nodules Detected on CT Images: From the Fleischner Society 2017. Guidelines apply to incidental nodules in patients who are 35 years or older. Guidelines do not apply to lung cancer screening, patients with immunosuppression, or patients with known primary cancer. SINGLE NODULE Nodule size <6 mm Low-risk patients: No follow-up needed. High-risk patients: Optional follow-up at 12 months.       Lab Results:  Personally Reviewed.   Fingerstick Blood Glucose: @PLGVOSC88EIO(POCGLUFGR:10)@    Last Hbg A1C: No  results found for: HGBA1C   Lab Results   Component Value Date    INR 1.24 (H) 05/10/2022    INR 1.19 (H) 05/09/2022     Recent Results (from the past 24 hour(s))   Hemoglobin    Collection Time: 05/10/22  3:46 PM   Result Value Ref Range    Hemoglobin 11.0 (L) 13.3 - 17.7 g/dL   Basic metabolic panel    Collection Time: 05/11/22  4:41 AM   Result Value Ref Range    Sodium 138 136 - 145 mmol/L    Potassium 3.7 3.5 - 5.0 mmol/L    Chloride 102 98 - 107 mmol/L    Carbon Dioxide (CO2) 26 22 - 31 mmol/L    Anion Gap 10 5 - 18 mmol/L    Urea Nitrogen 11 8 - 22 mg/dL    Creatinine 0.77 0.70 - 1.30 mg/dL    Calcium 8.0 (L) 8.5 - 10.5 mg/dL    Glucose 124 70 - 125 mg/dL    GFR Estimate >90 >60 mL/min/1.73m2   CBC with platelets    Collection Time: 05/11/22  4:41 AM   Result Value Ref Range    WBC Count 7.3 4.0 - 11.0 10e3/uL    RBC Count 3.03 (L) 4.40 - 5.90 10e6/uL    Hemoglobin 10.4 (L) 13.3 - 17.7 g/dL    Hematocrit 30.7 (L) 40.0 - 53.0 %     (H) 78 - 100 fL    MCH 34.3 (H) 26.5 - 33.0 pg    MCHC 33.9 31.5 - 36.5 g/dL    RDW 12.1 10.0 - 15.0 %    Platelet Count 280 150 - 450 10e3/uL           Advanced Care Planning    Time > 35 minutes with greater than 50% of time spent in counseling and coordination of care.    Beni Lopez MD  Date: 5/11/2022  Time: 3:20 PM  [facility]

## 2022-05-11 NOTE — PROGRESS NOTES
"1340 Sitting up in chair, room air SpO2 95 %, NIF - 60, FVC 2020 ml, good effort and technique, Pt encouraged to continue to perform IS and flutter valve Q 1-2 hours w/a on his own, pt has been performing routinely on his own, RT to monitor, continue to perform NIF and FVC TID.     /79 (BP Location: Left arm)   Pulse 87   Temp 98.3  F (36.8  C) (Oral)   Resp 22   Ht 1.778 m (5' 10\")   Wt 91.2 kg (201 lb)   SpO2 95%   BMI 28.84 kg/m  .      "

## 2022-05-11 NOTE — PROGRESS NOTES
Pt assess for NIF and FVC. Pt achieved -60 for NIF and 1.89 L for FVC. RTs will continue to monitor pt and assess pt.    Shantelle Sosa, RT

## 2022-05-11 NOTE — PROGRESS NOTES
"O2 2 lpm/NC, SpO2 97 %, O2 decreased to room air. Performed flutter valve x 5 min, good technique IS at 2000, pt is performing routinely on his own, will change flutter valve to PRN RT.     NIF > -60, FVC 1770 ml. Good effort and technique. RT to follow.     BP (!) 169/83 (BP Location: Left arm)   Pulse 83   Temp 98.4  F (36.9  C) (Oral)   Resp 22   Ht 1.778 m (5' 10\")   Wt 91.2 kg (201 lb)   SpO2 95%   BMI 28.84 kg/m        "

## 2022-05-11 NOTE — PROGRESS NOTES
Care Management Follow Up    Length of Stay (days): 2    Expected Discharge Date: 05/13/2022     Concerns to be Addressed:  Surgical Clearance-Chest Tube.  Oxygen Needs-2 Liters.   Patient plan of care discussed at interdisciplinary rounds: Yes    Anticipated Discharge Disposition:  Home     Anticipated Discharge Services:   Therapy recommending Home  Anticipated Discharge DME:  To be determined    Patient/family educated on Medicare website which has current facility and service quality ratings:  Not needed at this time  Education Provided on the Discharge Plan:  Yes  Patient/Family in Agreement with the Plan:  Yes    Referrals Placed by CM/SW:  None at this time   Private pay costs discussed: Not applicable    Additional Information:  Chart Reviewed. From home with spouse and independent at baseline. Therapy recommending home at discharge. Family to transport. Care manager to follow.       Elsi Price RN

## 2022-05-11 NOTE — PROGRESS NOTES
General Surgery Progress Note:    Hospital Day # 2    ASSESSMENT:   1. Hemothorax on left    2. Multiple fractures of ribs, left side, initial encounter for closed fracture    3. Hypoxia        PLAN:   -The patient is improving on a.m. chest x-ray.  The chest tube is still putting out serosanguineous output.  There is no air leak.  Since the patient shows improvement with just the chest tube, we will hold off the VATS for now.  If the patient has any changes in his signs and symptoms or laboratory findings, we may proceed with a VATS at a later date.  -Patient has been placed on waterseal.  We will repeat daily chest x-rays.  -Continue with diet as tolerated.  -Continue pulmonary hygiene  -Continue management per primary team.      SUBJECTIVE:   Dav TANG Buck seen on a.m. rounds.  He has some mild discomfort over the chest tube is.  Otherwise the patient has improvement of his shortness of breath.  Patient has no fevers or chills.  He has no further complaints at this time.    Patient Vitals for the past 24 hrs:   BP Temp Temp src Pulse Resp SpO2   05/11/22 0853 -- -- -- 85 -- 96 %   05/11/22 0813 (!) 169/83 98.4  F (36.9  C) Oral 83 22 95 %   05/11/22 0743 -- -- -- 81 25 97 %   05/11/22 0400 -- -- -- 77 24 98 %   05/11/22 0300 -- -- -- 80 19 97 %   05/11/22 0200 -- -- -- 77 -- 97 %   05/11/22 0100 -- -- -- 78 23 98 %   05/11/22 0000 -- -- -- 78 24 90 %   05/10/22 2100 (!) 158/86 98.3  F (36.8  C) Oral 81 23 94 %   05/10/22 1604 (!) 144/89 98.3  F (36.8  C) Oral 80 19 98 %   05/10/22 1600 (!) 144/89 -- -- 80 24 98 %   05/10/22 1236 131/81 98.2  F (36.8  C) Oral 86 22 95 %   05/10/22 1214 (!) 151/71 -- -- 83 25 96 %       Physical Exam:  General: NAD, pleasant  CV:RRR  LUNGS:Normal respiratory effort, no accessory muscle use, breath sounds bilaterally on auscultation.  Left-sided chest tube with serosanguineous output.  The patient also has no airleak.  Minimal tenderness over the left side of the chest  pain  ABD: Abdomen soft, nondistended, nontender to palpation.  EXT:no CCE    Admission on 05/09/2022   Component Date Value     WBC Count 05/09/2022 12.2 (A)     RBC Count 05/09/2022 3.33 (A)     Hemoglobin 05/09/2022 11.7 (A)     Hematocrit 05/09/2022 33.6 (A)     MCV 05/09/2022 101 (A)     MCH 05/09/2022 35.1 (A)     MCHC 05/09/2022 34.8      RDW 05/09/2022 12.5      Platelet Count 05/09/2022 317      Sodium 05/09/2022 133 (A)     Potassium 05/09/2022 3.7      Chloride 05/09/2022 96 (A)     Carbon Dioxide (CO2) 05/09/2022 23      Anion Gap 05/09/2022 14      Urea Nitrogen 05/09/2022 10      Creatinine 05/09/2022 0.78      Calcium 05/09/2022 9.1      Glucose 05/09/2022 130 (A)     GFR Estimate 05/09/2022 >90      Hold Specimen 05/09/2022 [facility]      Hold Specimen 05/09/2022 [facility]      Hold Specimen 05/09/2022 [facility]      Radiologist flags 05/09/2022 Large left hemothorax with acute hemorrhagic components and multiple rib fractures. No pneumothorax. Needs a chest tube. (A)     INR 05/09/2022 1.19 (A)     SARS CoV2 PCR 05/09/2022 Negative      pH Arterial 05/10/2022 7.41      pCO2 Arterial 05/10/2022 39      pO2 Arterial 05/10/2022 89 (A)     Bicarbonate Arterial 05/10/2022 25      O2 Sat, Arterial 05/10/2022 98.7 (A)     Oxyhemoglobin 05/10/2022 96.5 (A)     Base Excess/Deficit (+/-) 05/10/2022 0.4      Ventilation Mode 05/10/2022 nasal cannula      Flow 05/10/2022 3.0      Sample Stabilized Temper* 05/10/2022 37.0      Magnesium 05/09/2022 2.1      Phosphorus 05/09/2022 2.8      Vitamin D, Total (25-Hyd* 05/09/2022 49      Sodium 05/10/2022 134 (A)     Potassium 05/10/2022 3.5      Chloride 05/10/2022 100      Carbon Dioxide (CO2) 05/10/2022 22      Anion Gap 05/10/2022 12      Urea Nitrogen 05/10/2022 12      Creatinine 05/10/2022 0.81      Calcium 05/10/2022 7.7 (A)     Glucose 05/10/2022 112      Alkaline Phosphatase 05/10/2022 82      AST 05/10/2022 28      ALT 05/10/2022 25      Protein Total 05/10/2022 6.1       Albumin 05/10/2022 2.2 (A)     Bilirubin Total 05/10/2022 0.6      GFR Estimate 05/10/2022 >90      WBC Count 05/10/2022 8.0      RBC Count 05/10/2022 2.79 (A)     Hemoglobin 05/10/2022 9.5 (A)     Hematocrit 05/10/2022 28.6 (A)     MCV 05/10/2022 103 (A)     MCH 05/10/2022 34.1 (A)     MCHC 05/10/2022 33.2      RDW 05/10/2022 12.5      Platelet Count 05/10/2022 244      INR 05/10/2022 1.24 (A)     ABO/RH(D) 05/10/2022 A POS      Antibody Screen 05/10/2022 Negative      SPECIMEN EXPIRATION DATE 05/10/2022 [id number]      Hemoglobin 05/10/2022 11.0 (A)     Sodium 05/11/2022 138      Potassium 05/11/2022 3.7      Chloride 05/11/2022 102      Carbon Dioxide (CO2) 05/11/2022 26      Anion Gap 05/11/2022 10      Urea Nitrogen 05/11/2022 11      Creatinine 05/11/2022 0.77      Calcium 05/11/2022 8.0 (A)     Glucose 05/11/2022 124      GFR Estimate 05/11/2022 >90      WBC Count 05/11/2022 7.3      RBC Count 05/11/2022 3.03 (A)     Hemoglobin 05/11/2022 10.4 (A)     Hematocrit 05/11/2022 30.7 (A)     MCV 05/11/2022 101 (A)     MCH 05/11/2022 34.3 (A)     MCHC 05/11/2022 33.9      RDW 05/11/2022 12.1      Platelet Count 05/11/2022 280         DO Colin Phillips DO  General Surgeon  [facility]  Surgery [address] [address]?  Office: [phone]  Employed by - [facility]  Pager: [phone]

## 2022-05-12 ENCOUNTER — APPOINTMENT (OUTPATIENT)
Dept: RADIOLOGY | Facility: HOSPITAL | Age: 67
DRG: 199 | End: 2022-05-12
Attending: INTERNAL MEDICINE
Payer: COMMERCIAL

## 2022-05-12 ENCOUNTER — APPOINTMENT (OUTPATIENT)
Dept: RADIOLOGY | Facility: HOSPITAL | Age: 67
DRG: 199 | End: 2022-05-12
Attending: SURGERY
Payer: COMMERCIAL

## 2022-05-12 VITALS
RESPIRATION RATE: 18 BRPM | WEIGHT: 201 LBS | SYSTOLIC BLOOD PRESSURE: 159 MMHG | DIASTOLIC BLOOD PRESSURE: 82 MMHG | HEART RATE: 87 BPM | BODY MASS INDEX: 28.77 KG/M2 | TEMPERATURE: 98 F | OXYGEN SATURATION: 94 % | HEIGHT: 70 IN

## 2022-05-12 LAB
ANION GAP SERPL CALCULATED.3IONS-SCNC: 9 MMOL/L (ref 5–18)
BUN SERPL-MCNC: 11 MG/DL (ref 8–22)
CALCIUM SERPL-MCNC: 7.9 MG/DL (ref 8.5–10.5)
CHLORIDE BLD-SCNC: 102 MMOL/L (ref 98–107)
CO2 SERPL-SCNC: 27 MMOL/L (ref 22–31)
CREAT SERPL-MCNC: 0.68 MG/DL (ref 0.7–1.3)
ERYTHROCYTE [DISTWIDTH] IN BLOOD BY AUTOMATED COUNT: 12.4 % (ref 10–15)
GFR SERPL CREATININE-BSD FRML MDRD: >90 ML/MIN/1.73M2
GLUCOSE BLD-MCNC: 115 MG/DL (ref 70–125)
HCT VFR BLD AUTO: 29.7 % (ref 40–53)
HGB BLD-MCNC: 10.3 G/DL (ref 13.3–17.7)
MCH RBC QN AUTO: 34.6 PG (ref 26.5–33)
MCHC RBC AUTO-ENTMCNC: 34.7 G/DL (ref 31.5–36.5)
MCV RBC AUTO: 100 FL (ref 78–100)
PLATELET # BLD AUTO: 299 10E3/UL (ref 150–450)
POTASSIUM BLD-SCNC: 3.5 MMOL/L (ref 3.5–5)
RBC # BLD AUTO: 2.98 10E6/UL (ref 4.4–5.9)
SODIUM SERPL-SCNC: 138 MMOL/L (ref 136–145)
WBC # BLD AUTO: 6.9 10E3/UL (ref 4–11)

## 2022-05-12 PROCEDURE — 71045 X-RAY EXAM CHEST 1 VIEW: CPT

## 2022-05-12 PROCEDURE — 99231 SBSQ HOSP IP/OBS SF/LOW 25: CPT | Performed by: SURGERY

## 2022-05-12 PROCEDURE — 85027 COMPLETE CBC AUTOMATED: CPT | Performed by: EMERGENCY MEDICINE

## 2022-05-12 PROCEDURE — 250N000013 HC RX MED GY IP 250 OP 250 PS 637: Performed by: INTERNAL MEDICINE

## 2022-05-12 PROCEDURE — 71045 X-RAY EXAM CHEST 1 VIEW: CPT | Mod: 77

## 2022-05-12 PROCEDURE — 99239 HOSP IP/OBS DSCHRG MGMT >30: CPT | Performed by: FAMILY MEDICINE

## 2022-05-12 PROCEDURE — 36415 COLL VENOUS BLD VENIPUNCTURE: CPT | Performed by: EMERGENCY MEDICINE

## 2022-05-12 PROCEDURE — 80048 BASIC METABOLIC PNL TOTAL CA: CPT | Performed by: INTERNAL MEDICINE

## 2022-05-12 RX ORDER — HYDROCODONE BITARTRATE AND ACETAMINOPHEN 5; 325 MG/1; MG/1
1 TABLET ORAL EVERY 6 HOURS PRN
Qty: 18 TABLET | Refills: 0 | Status: SHIPPED | OUTPATIENT
Start: 2022-05-12 | End: 2022-05-15

## 2022-05-12 RX ORDER — DOCUSATE SODIUM 100 MG/1
100 CAPSULE, LIQUID FILLED ORAL 2 TIMES DAILY
Qty: 30 CAPSULE | Refills: 0 | Status: SHIPPED | OUTPATIENT
Start: 2022-05-12

## 2022-05-12 RX ADMIN — GABAPENTIN 300 MG: 300 CAPSULE ORAL at 08:34

## 2022-05-12 RX ADMIN — GABAPENTIN 300 MG: 300 CAPSULE ORAL at 13:30

## 2022-05-12 RX ADMIN — ACETAMINOPHEN 975 MG: 325 TABLET ORAL at 05:54

## 2022-05-12 RX ADMIN — ACETAMINOPHEN 975 MG: 325 TABLET ORAL at 13:30

## 2022-05-12 ASSESSMENT — ACTIVITIES OF DAILY LIVING (ADL)
ADLS_ACUITY_SCORE: 20

## 2022-05-12 NOTE — PLAN OF CARE
Discharge instructions given to pt. Questions/concerns answered by RN to his best ability. No questions/concerns at this time. Pt given meds and were able to sign forms. Pt will be picked up by wife. Pt will notify staff when ready to go downstairs via wheelchair.

## 2022-05-12 NOTE — PLAN OF CARE
"  Problem: Plan of Care - These are the overarching goals to be used throughout the patient stay.    Goal: Plan of Care Review/Shift Note  Description: The Plan of Care Review/Shift note should be completed every shift.  The Outcome Evaluation is a brief statement about your assessment that the patient is improving, declining, or no change.  This information will be displayed automatically on your shift note.  5/12/2022 1408 by Precious Delcid RN  Outcome: Ongoing, Progressing  5/12/2022 1407 by Precious Delcid RN  Outcome: Ongoing, Progressing  Goal: Patient-Specific Goal (Individualized)  Description: You can add care plan individualizations to a care plan. Examples of Individualization might be:  \"Parent requests to be called daily at 9am for status\", \"I have a hard time hearing out of my right ear\", or \"Do not touch me to wake me up as it startles me\".  5/12/2022 1408 by Precious Delcid RN  Outcome: Ongoing, Progressing  5/12/2022 1407 by Precious Delcid RN  Outcome: Ongoing, Progressing  Goal: Absence of Hospital-Acquired Illness or Injury  5/12/2022 1408 by Precious Delcid RN  Outcome: Ongoing, Progressing  5/12/2022 1407 by Precious Delcid RN  Outcome: Ongoing, Progressing  Intervention: Identify and Manage Fall Risk  Recent Flowsheet Documentation  Taken 5/12/2022 0905 by Precious Delcid RN  Safety Promotion/Fall Prevention: nonskid shoes/slippers when out of bed  Intervention: Prevent and Manage VTE (Venous Thromboembolism) Risk  Recent Flowsheet Documentation  Taken 5/12/2022 0815 by Precious Delcid RN  Activity Management: ambulated to bathroom  Goal: Optimal Comfort and Wellbeing  5/12/2022 1408 by Precious Delcid RN  Outcome: Ongoing, Progressing  5/12/2022 1407 by Precious Delcid RN  Outcome: Ongoing, Progressing  Goal: Readiness for Transition of Care  5/12/2022 1408 by Precious Delcid RN  Outcome: Ongoing, Progressing  5/12/2022 1407 by Precious Delcid" RN  Outcome: Ongoing, Progressing     Problem: Gas Exchange Impaired  Goal: Optimal Gas Exchange  Outcome: Ongoing, Progressing  Pt CT d/c'd this am. Tolerated well, maintained O2 sats >92% this shift. Ambulated halls with minimal SOB. Dressing intact from CT removal this am. Pending discharge home this evening.     Goal Outcome Evaluation:
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
## 2022-05-12 NOTE — PROGRESS NOTES
General Surgery Progress Note:    Hospital Day # 3    ASSESSMENT:   1. Hemothorax on left    2. Multiple fractures of ribs, left side, initial encounter for closed fracture    3. Hypoxia        PLAN:   -The left-sided chest tube has been removed this morning.  I will repeat a chest x-ray at 11:30 AM to reevaluate to ensure that there is no pneumothorax.  -Please do not remove the left-sided chest tube dressing until May 14.  (48 hours).  -If the patient continues to do well, the patient can be discharged from my standpoint later this afternoon or tomorrow morning.  -Patient can follow-up with myself in surgery clinic.  -Pain medication has been provided.    SUBJECTIVE:   Dav Jane seen on a.m. rounds.  He has minimal output from that tube.  Patient is doing well.  Mild discomfort from the left-sided chest tube.  Otherwise patient has no fevers no chills no further complaints.    Patient Vitals for the past 24 hrs:   BP Temp Temp src Pulse Resp SpO2   05/12/22 0823 (!) 155/91 98.2  F (36.8  C) Oral 92 18 93 %   05/12/22 0415 137/80 -- -- -- -- --   05/12/22 0200 (!) 165/83 -- -- 82 -- --   05/12/22 0100 (!) 189/97 -- -- -- -- --   05/12/22 0055 (!) 194/98 98.5  F (36.9  C) Oral 89 20 95 %   05/11/22 2008 (!) 146/76 99  F (37.2  C) Oral 85 20 93 %   05/11/22 1207 138/79 98.3  F (36.8  C) Oral 87 22 95 %   05/11/22 0853 -- -- -- 85 -- 96 %       Physical Exam:  General: NAD, pleasant  CV:RRR  LUNGS:Normal respiratory effort, no accessory muscle use, breath sounds bilaterally on auscultation.  Left-sided chest tube minimal output.  Output is serosanguineous.  No airleak.  Minimal tenderness to left chest.  ABD: Abdomen soft, nondistended, nontender to palpation.  EXT:no CCE    Colin Ferguson, DO Colin Ferguson,   General Surgeon  [facility]  Surgery [place] [id number] [place] [address] [id number]  [place] [address]?  Office: [phone]  Employed by - [facility]  Services  Pager: [phone]

## 2022-05-13 DIAGNOSIS — Z71.89 OTHER SPECIFIED COUNSELING: ICD-10-CM

## 2022-05-23 ENCOUNTER — LAB REQUISITION (OUTPATIENT)
Dept: LAB | Facility: CLINIC | Age: 67
End: 2022-05-23
Payer: COMMERCIAL

## 2022-05-23 DIAGNOSIS — J94.2 HEMOTHORAX: ICD-10-CM

## 2022-05-23 DIAGNOSIS — I10 ESSENTIAL (PRIMARY) HYPERTENSION: ICD-10-CM

## 2022-05-23 LAB
ANION GAP SERPL CALCULATED.3IONS-SCNC: 11 MMOL/L (ref 5–18)
BUN SERPL-MCNC: 16 MG/DL (ref 8–22)
CALCIUM SERPL-MCNC: 9.3 MG/DL (ref 8.5–10.5)
CHLORIDE BLD-SCNC: 104 MMOL/L (ref 98–107)
CO2 SERPL-SCNC: 24 MMOL/L (ref 22–31)
CREAT SERPL-MCNC: 0.87 MG/DL (ref 0.7–1.3)
GFR SERPL CREATININE-BSD FRML MDRD: >90 ML/MIN/1.73M2
GLUCOSE BLD-MCNC: 96 MG/DL (ref 70–125)
HGB BLD-MCNC: 12 G/DL (ref 13.3–17.7)
POTASSIUM BLD-SCNC: 5.3 MMOL/L (ref 3.5–5)
SODIUM SERPL-SCNC: 139 MMOL/L (ref 136–145)

## 2022-05-23 PROCEDURE — 80048 BASIC METABOLIC PNL TOTAL CA: CPT | Mod: ORL | Performed by: FAMILY MEDICINE

## 2022-05-23 PROCEDURE — 85018 HEMOGLOBIN: CPT | Mod: ORL | Performed by: FAMILY MEDICINE

## 2022-06-20 ENCOUNTER — LAB REQUISITION (OUTPATIENT)
Dept: LAB | Facility: CLINIC | Age: 67
End: 2022-06-20
Payer: COMMERCIAL

## 2022-06-20 DIAGNOSIS — Z12.5 ENCOUNTER FOR SCREENING FOR MALIGNANT NEOPLASM OF PROSTATE: ICD-10-CM

## 2022-06-20 DIAGNOSIS — I10 ESSENTIAL (PRIMARY) HYPERTENSION: ICD-10-CM

## 2022-06-20 LAB
ANION GAP SERPL CALCULATED.3IONS-SCNC: 13 MMOL/L (ref 5–18)
BUN SERPL-MCNC: 9 MG/DL (ref 8–22)
CALCIUM SERPL-MCNC: 9.3 MG/DL (ref 8.5–10.5)
CHLORIDE BLD-SCNC: 104 MMOL/L (ref 98–107)
CHOLEST SERPL-MCNC: 220 MG/DL
CO2 SERPL-SCNC: 21 MMOL/L (ref 22–31)
CREAT SERPL-MCNC: 1.02 MG/DL (ref 0.7–1.3)
GFR SERPL CREATININE-BSD FRML MDRD: 81 ML/MIN/1.73M2
GLUCOSE BLD-MCNC: 160 MG/DL (ref 70–125)
HDLC SERPL-MCNC: 44 MG/DL
LDLC SERPL CALC-MCNC: 129 MG/DL
POTASSIUM BLD-SCNC: 4.3 MMOL/L (ref 3.5–5)
PSA SERPL-MCNC: 1.08 UG/L (ref 0–4.5)
SODIUM SERPL-SCNC: 138 MMOL/L (ref 136–145)
TRIGL SERPL-MCNC: 235 MG/DL

## 2022-06-20 PROCEDURE — 80061 LIPID PANEL: CPT | Mod: ORL | Performed by: FAMILY MEDICINE

## 2022-06-20 PROCEDURE — G0103 PSA SCREENING: HCPCS | Mod: ORL | Performed by: FAMILY MEDICINE

## 2022-06-20 PROCEDURE — 80048 BASIC METABOLIC PNL TOTAL CA: CPT | Mod: ORL | Performed by: FAMILY MEDICINE

## 2023-04-13 ENCOUNTER — LAB REQUISITION (OUTPATIENT)
Dept: LAB | Facility: CLINIC | Age: 68
End: 2023-04-13
Payer: COMMERCIAL

## 2023-04-13 DIAGNOSIS — I10 ESSENTIAL (PRIMARY) HYPERTENSION: ICD-10-CM

## 2023-04-13 DIAGNOSIS — R73.09 OTHER ABNORMAL GLUCOSE: ICD-10-CM

## 2023-04-13 LAB
ANION GAP SERPL CALCULATED.3IONS-SCNC: 14 MMOL/L (ref 7–15)
BUN SERPL-MCNC: 20.9 MG/DL (ref 8–23)
CALCIUM SERPL-MCNC: 9.9 MG/DL (ref 8.8–10.2)
CHLORIDE SERPL-SCNC: 100 MMOL/L (ref 98–107)
CREAT SERPL-MCNC: 1.17 MG/DL (ref 0.67–1.17)
DEPRECATED HCO3 PLAS-SCNC: 23 MMOL/L (ref 22–29)
GFR SERPL CREATININE-BSD FRML MDRD: 68 ML/MIN/1.73M2
GLUCOSE SERPL-MCNC: 123 MG/DL (ref 70–99)
HBA1C MFR BLD: 5.7 %
POTASSIUM SERPL-SCNC: 4.3 MMOL/L (ref 3.4–5.3)
SODIUM SERPL-SCNC: 137 MMOL/L (ref 136–145)

## 2023-04-13 PROCEDURE — 83036 HEMOGLOBIN GLYCOSYLATED A1C: CPT | Mod: ORL | Performed by: FAMILY MEDICINE

## 2023-04-13 PROCEDURE — 80048 BASIC METABOLIC PNL TOTAL CA: CPT | Mod: ORL | Performed by: FAMILY MEDICINE

## 2023-10-17 ENCOUNTER — LAB REQUISITION (OUTPATIENT)
Dept: LAB | Facility: CLINIC | Age: 68
End: 2023-10-17
Payer: COMMERCIAL

## 2023-10-17 DIAGNOSIS — E78.5 HYPERLIPIDEMIA, UNSPECIFIED: ICD-10-CM

## 2023-10-17 DIAGNOSIS — Z12.5 ENCOUNTER FOR SCREENING FOR MALIGNANT NEOPLASM OF PROSTATE: ICD-10-CM

## 2023-10-17 DIAGNOSIS — I10 ESSENTIAL (PRIMARY) HYPERTENSION: ICD-10-CM

## 2023-10-17 LAB
ANION GAP SERPL CALCULATED.3IONS-SCNC: 12 MMOL/L (ref 7–15)
BUN SERPL-MCNC: 18.3 MG/DL (ref 8–23)
CALCIUM SERPL-MCNC: 9.7 MG/DL (ref 8.8–10.2)
CHLORIDE SERPL-SCNC: 103 MMOL/L (ref 98–107)
CHOLEST SERPL-MCNC: 218 MG/DL
CREAT SERPL-MCNC: 1.16 MG/DL (ref 0.67–1.17)
DEPRECATED HCO3 PLAS-SCNC: 22 MMOL/L (ref 22–29)
EGFRCR SERPLBLD CKD-EPI 2021: 69 ML/MIN/1.73M2
GLUCOSE SERPL-MCNC: 132 MG/DL (ref 70–99)
HDLC SERPL-MCNC: 42 MG/DL
LDLC SERPL CALC-MCNC: 156 MG/DL
NONHDLC SERPL-MCNC: 176 MG/DL
POTASSIUM SERPL-SCNC: 4.3 MMOL/L (ref 3.4–5.3)
PSA SERPL DL<=0.01 NG/ML-MCNC: 1 NG/ML (ref 0–4.5)
SODIUM SERPL-SCNC: 137 MMOL/L (ref 135–145)
TRIGL SERPL-MCNC: 102 MG/DL

## 2023-10-17 PROCEDURE — 80061 LIPID PANEL: CPT | Mod: ORL | Performed by: FAMILY MEDICINE

## 2023-10-17 PROCEDURE — G0103 PSA SCREENING: HCPCS | Mod: ORL | Performed by: FAMILY MEDICINE

## 2023-10-17 PROCEDURE — 80048 BASIC METABOLIC PNL TOTAL CA: CPT | Mod: ORL | Performed by: FAMILY MEDICINE

## 2024-06-10 ENCOUNTER — LAB REQUISITION (OUTPATIENT)
Dept: LAB | Facility: CLINIC | Age: 69
End: 2024-06-10
Payer: COMMERCIAL

## 2024-06-10 DIAGNOSIS — I10 ESSENTIAL (PRIMARY) HYPERTENSION: ICD-10-CM

## 2024-06-10 DIAGNOSIS — R73.9 HYPERGLYCEMIA, UNSPECIFIED: ICD-10-CM

## 2024-06-10 LAB
ANION GAP SERPL CALCULATED.3IONS-SCNC: 10 MMOL/L (ref 7–15)
BUN SERPL-MCNC: 14.9 MG/DL (ref 8–23)
CALCIUM SERPL-MCNC: 9.7 MG/DL (ref 8.8–10.2)
CHLORIDE SERPL-SCNC: 101 MMOL/L (ref 98–107)
CREAT SERPL-MCNC: 1.08 MG/DL (ref 0.67–1.17)
DEPRECATED HCO3 PLAS-SCNC: 25 MMOL/L (ref 22–29)
EGFRCR SERPLBLD CKD-EPI 2021: 75 ML/MIN/1.73M2
GLUCOSE SERPL-MCNC: 134 MG/DL (ref 70–99)
HBA1C MFR BLD: 6.1 %
POTASSIUM SERPL-SCNC: 4.9 MMOL/L (ref 3.4–5.3)
SODIUM SERPL-SCNC: 136 MMOL/L (ref 135–145)

## 2024-06-10 PROCEDURE — 83036 HEMOGLOBIN GLYCOSYLATED A1C: CPT | Mod: ORL | Performed by: FAMILY MEDICINE

## 2024-06-10 PROCEDURE — 80048 BASIC METABOLIC PNL TOTAL CA: CPT | Mod: ORL | Performed by: FAMILY MEDICINE

## 2024-11-11 ENCOUNTER — LAB REQUISITION (OUTPATIENT)
Dept: LAB | Facility: CLINIC | Age: 69
End: 2024-11-11
Payer: COMMERCIAL

## 2024-11-11 DIAGNOSIS — Z12.5 ENCOUNTER FOR SCREENING FOR MALIGNANT NEOPLASM OF PROSTATE: ICD-10-CM

## 2024-11-11 DIAGNOSIS — I10 ESSENTIAL (PRIMARY) HYPERTENSION: ICD-10-CM

## 2024-11-11 DIAGNOSIS — E78.5 HYPERLIPIDEMIA, UNSPECIFIED: ICD-10-CM

## 2024-11-11 LAB
ANION GAP SERPL CALCULATED.3IONS-SCNC: 12 MMOL/L (ref 7–15)
BUN SERPL-MCNC: 17.8 MG/DL (ref 8–23)
CALCIUM SERPL-MCNC: 9.1 MG/DL (ref 8.8–10.4)
CHLORIDE SERPL-SCNC: 106 MMOL/L (ref 98–107)
CHOLEST SERPL-MCNC: 243 MG/DL
CREAT SERPL-MCNC: 0.99 MG/DL (ref 0.67–1.17)
EGFRCR SERPLBLD CKD-EPI 2021: 83 ML/MIN/1.73M2
FASTING STATUS PATIENT QL REPORTED: ABNORMAL
FASTING STATUS PATIENT QL REPORTED: ABNORMAL
GLUCOSE SERPL-MCNC: 134 MG/DL (ref 70–99)
HCO3 SERPL-SCNC: 23 MMOL/L (ref 22–29)
HDLC SERPL-MCNC: 60 MG/DL
LDLC SERPL CALC-MCNC: 149 MG/DL
NONHDLC SERPL-MCNC: 183 MG/DL
POTASSIUM SERPL-SCNC: 4.2 MMOL/L (ref 3.4–5.3)
PSA SERPL DL<=0.01 NG/ML-MCNC: 1.03 NG/ML (ref 0–4.5)
SODIUM SERPL-SCNC: 141 MMOL/L (ref 135–145)
TRIGL SERPL-MCNC: 169 MG/DL

## 2024-11-11 PROCEDURE — G0103 PSA SCREENING: HCPCS | Mod: ORL | Performed by: FAMILY MEDICINE

## 2024-11-11 PROCEDURE — 80048 BASIC METABOLIC PNL TOTAL CA: CPT | Mod: ORL | Performed by: FAMILY MEDICINE

## 2024-11-11 PROCEDURE — 80061 LIPID PANEL: CPT | Mod: ORL | Performed by: FAMILY MEDICINE

## 2025-05-12 ENCOUNTER — LAB REQUISITION (OUTPATIENT)
Dept: LAB | Facility: CLINIC | Age: 70
End: 2025-05-12
Payer: COMMERCIAL

## 2025-05-12 DIAGNOSIS — I10 ESSENTIAL (PRIMARY) HYPERTENSION: ICD-10-CM

## 2025-05-12 PROCEDURE — 80048 BASIC METABOLIC PNL TOTAL CA: CPT | Mod: ORL | Performed by: FAMILY MEDICINE

## 2025-05-13 LAB
ANION GAP SERPL CALCULATED.3IONS-SCNC: 13 MMOL/L (ref 7–15)
BUN SERPL-MCNC: 13.6 MG/DL (ref 8–23)
CALCIUM SERPL-MCNC: 9.3 MG/DL (ref 8.8–10.4)
CHLORIDE SERPL-SCNC: 104 MMOL/L (ref 98–107)
CREAT SERPL-MCNC: 1.01 MG/DL (ref 0.67–1.17)
EGFRCR SERPLBLD CKD-EPI 2021: 81 ML/MIN/1.73M2
GLUCOSE SERPL-MCNC: 132 MG/DL (ref 70–99)
HCO3 SERPL-SCNC: 21 MMOL/L (ref 22–29)
POTASSIUM SERPL-SCNC: 4.3 MMOL/L (ref 3.4–5.3)
SODIUM SERPL-SCNC: 138 MMOL/L (ref 135–145)